# Patient Record
Sex: FEMALE | Race: WHITE | Employment: FULL TIME | URBAN - METROPOLITAN AREA
[De-identification: names, ages, dates, MRNs, and addresses within clinical notes are randomized per-mention and may not be internally consistent; named-entity substitution may affect disease eponyms.]

---

## 2017-07-12 ENCOUNTER — HOSPITAL ENCOUNTER (EMERGENCY)
Facility: CLINIC | Age: 22
Discharge: HOME | End: 2017-07-12
Attending: EMERGENCY MEDICINE
Payer: COMMERCIAL

## 2017-07-12 VITALS
TEMPERATURE: 97.9 F | SYSTOLIC BLOOD PRESSURE: 117 MMHG | HEIGHT: 64 IN | OXYGEN SATURATION: 100 % | BODY MASS INDEX: 24.24 KG/M2 | WEIGHT: 142 LBS | DIASTOLIC BLOOD PRESSURE: 71 MMHG | HEART RATE: 64 BPM | RESPIRATION RATE: 14 BRPM

## 2017-07-12 DIAGNOSIS — H10.33 ACUTE CONJUNCTIVITIS OF BOTH EYES, UNSPECIFIED ACUTE CONJUNCTIVITIS TYPE: Primary | ICD-10-CM

## 2017-07-12 PROCEDURE — 99282 EMERGENCY DEPT VISIT SF MDM: CPT

## 2017-07-12 RX ORDER — TOBRAMYCIN 3 MG/ML
1 SOLUTION/ DROPS OPHTHALMIC EVERY 4 HOURS
Qty: 5 ML | Refills: 0 | Status: SHIPPED | OUTPATIENT
Start: 2017-07-12 | End: 2017-07-22

## 2017-08-01 ENCOUNTER — APPOINTMENT (OUTPATIENT)
Dept: GENERAL RADIOLOGY | Facility: CLINIC | Age: 22
End: 2017-08-01
Payer: COMMERCIAL

## 2017-08-01 ENCOUNTER — HOSPITAL ENCOUNTER (EMERGENCY)
Facility: CLINIC | Age: 22
Discharge: HOME | End: 2017-08-01
Attending: EMERGENCY MEDICINE
Payer: COMMERCIAL

## 2017-08-01 VITALS
OXYGEN SATURATION: 98 % | DIASTOLIC BLOOD PRESSURE: 65 MMHG | RESPIRATION RATE: 16 BRPM | HEART RATE: 85 BPM | BODY MASS INDEX: 23.05 KG/M2 | SYSTOLIC BLOOD PRESSURE: 118 MMHG | HEIGHT: 64 IN | TEMPERATURE: 98.4 F | WEIGHT: 135 LBS

## 2017-08-01 DIAGNOSIS — R05.9 COUGH: Primary | ICD-10-CM

## 2017-08-01 PROCEDURE — 71020 XR CHEST STANDARD TWO VW: CPT

## 2017-08-01 PROCEDURE — 99283 EMERGENCY DEPT VISIT LOW MDM: CPT

## 2017-08-01 RX ORDER — BENZONATATE 100 MG/1
100 CAPSULE ORAL 3 TIMES DAILY PRN
Qty: 30 CAPSULE | Refills: 0 | Status: SHIPPED | OUTPATIENT
Start: 2017-08-01 | End: 2017-08-08

## 2017-08-01 ASSESSMENT — PAIN SCALES - GENERAL: PAINLEVEL_OUTOF10: 6

## 2017-08-01 ASSESSMENT — PAIN DESCRIPTION - PAIN TYPE: TYPE: ACUTE PAIN

## 2017-08-01 ASSESSMENT — PAIN DESCRIPTION - LOCATION: LOCATION: THROAT

## 2018-02-05 ENCOUNTER — HOSPITAL ENCOUNTER (EMERGENCY)
Facility: CLINIC | Age: 23
Discharge: HOME | End: 2018-02-05
Attending: EMERGENCY MEDICINE
Payer: COMMERCIAL

## 2018-02-05 VITALS
BODY MASS INDEX: 24.32 KG/M2 | TEMPERATURE: 98.1 F | RESPIRATION RATE: 16 BRPM | SYSTOLIC BLOOD PRESSURE: 129 MMHG | HEIGHT: 65 IN | HEART RATE: 56 BPM | WEIGHT: 146 LBS | DIASTOLIC BLOOD PRESSURE: 69 MMHG | OXYGEN SATURATION: 100 %

## 2018-02-05 DIAGNOSIS — N39.0 URINARY TRACT INFECTION WITHOUT HEMATURIA, SITE UNSPECIFIED: Primary | ICD-10-CM

## 2018-02-05 LAB
-: ABNORMAL
AMORPHOUS: ABNORMAL
BACTERIA: ABNORMAL
BILIRUBIN URINE: NEGATIVE
CASTS UA: ABNORMAL /LPF (ref 0–2)
COLOR: YELLOW
COMMENT UA: ABNORMAL
CRYSTALS, UA: ABNORMAL /HPF
EPITHELIAL CELLS UA: ABNORMAL /HPF (ref 0–5)
GLUCOSE URINE: NEGATIVE
HCG(URINE) PREGNANCY TEST: NEGATIVE
KETONES, URINE: NEGATIVE
LEUKOCYTE ESTERASE, URINE: ABNORMAL
MUCUS: ABNORMAL
NITRITE, URINE: NEGATIVE
OTHER OBSERVATIONS UA: ABNORMAL
PH UA: 5.5 (ref 5–8)
PROTEIN UA: NEGATIVE
RBC UA: ABNORMAL /HPF (ref 0–2)
RENAL EPITHELIAL, UA: ABNORMAL /HPF
SPECIFIC GRAVITY UA: 1 (ref 1–1.03)
TRICHOMONAS: ABNORMAL
TURBIDITY: CLEAR
URINE HGB: ABNORMAL
UROBILINOGEN, URINE: NORMAL
WBC UA: ABNORMAL /HPF (ref 0–5)
YEAST: ABNORMAL

## 2018-02-05 PROCEDURE — 99283 EMERGENCY DEPT VISIT LOW MDM: CPT

## 2018-02-05 PROCEDURE — 84703 CHORIONIC GONADOTROPIN ASSAY: CPT

## 2018-02-05 PROCEDURE — 87086 URINE CULTURE/COLONY COUNT: CPT

## 2018-02-05 PROCEDURE — 87077 CULTURE AEROBIC IDENTIFY: CPT

## 2018-02-05 PROCEDURE — 81001 URINALYSIS AUTO W/SCOPE: CPT

## 2018-02-05 RX ORDER — SULFAMETHOXAZOLE AND TRIMETHOPRIM 800; 160 MG/1; MG/1
1 TABLET ORAL 2 TIMES DAILY
Qty: 14 TABLET | Refills: 0 | Status: SHIPPED | OUTPATIENT
Start: 2018-02-05 | End: 2018-02-12

## 2018-02-05 RX ORDER — FLUCONAZOLE 150 MG/1
150 TABLET ORAL ONCE
Qty: 1 TABLET | Refills: 0 | Status: SHIPPED | OUTPATIENT
Start: 2018-02-05 | End: 2018-02-05

## 2018-02-05 ASSESSMENT — PAIN DESCRIPTION - LOCATION
LOCATION: FLANK
LOCATION: FLANK;BACK

## 2018-02-05 ASSESSMENT — PAIN DESCRIPTION - PAIN TYPE
TYPE: ACUTE PAIN
TYPE: ACUTE PAIN

## 2018-02-05 ASSESSMENT — PAIN DESCRIPTION - ORIENTATION
ORIENTATION: RIGHT;LEFT
ORIENTATION: RIGHT;LEFT

## 2018-02-05 ASSESSMENT — PAIN SCALES - GENERAL
PAINLEVEL_OUTOF10: 7
PAINLEVEL_OUTOF10: 8

## 2018-02-07 LAB
CULTURE: ABNORMAL
Lab: ABNORMAL
SPECIMEN DESCRIPTION: ABNORMAL
SPECIMEN DESCRIPTION: ABNORMAL
STATUS: ABNORMAL

## 2018-03-15 ENCOUNTER — HOSPITAL ENCOUNTER (EMERGENCY)
Facility: CLINIC | Age: 23
Discharge: HOME | End: 2018-03-15
Attending: EMERGENCY MEDICINE
Payer: COMMERCIAL

## 2018-03-15 VITALS
WEIGHT: 140 LBS | OXYGEN SATURATION: 100 % | BODY MASS INDEX: 23.32 KG/M2 | HEART RATE: 68 BPM | DIASTOLIC BLOOD PRESSURE: 64 MMHG | HEIGHT: 65 IN | TEMPERATURE: 97.8 F | SYSTOLIC BLOOD PRESSURE: 116 MMHG | RESPIRATION RATE: 16 BRPM

## 2018-03-15 DIAGNOSIS — L01.00 IMPETIGO: Primary | ICD-10-CM

## 2018-03-15 PROCEDURE — 6370000000 HC RX 637 (ALT 250 FOR IP): Performed by: EMERGENCY MEDICINE

## 2018-03-15 PROCEDURE — 99282 EMERGENCY DEPT VISIT SF MDM: CPT

## 2018-03-15 RX ORDER — DOXYCYCLINE HYCLATE 100 MG
100 TABLET ORAL 2 TIMES DAILY
Qty: 20 TABLET | Refills: 0 | Status: ON HOLD | OUTPATIENT
Start: 2018-03-15 | End: 2018-05-28

## 2018-03-15 RX ORDER — DOXYCYCLINE 100 MG/1
100 CAPSULE ORAL ONCE
Status: COMPLETED | OUTPATIENT
Start: 2018-03-15 | End: 2018-03-15

## 2018-03-15 RX ADMIN — DOXYCYCLINE 100 MG: 100 CAPSULE ORAL at 22:12

## 2018-03-15 ASSESSMENT — ENCOUNTER SYMPTOMS
RHINORRHEA: 0
EYE PAIN: 0
NAUSEA: 0
CHEST TIGHTNESS: 0
SHORTNESS OF BREATH: 0
EYE DISCHARGE: 0
COUGH: 0
SORE THROAT: 0
BACK PAIN: 0
EYE REDNESS: 0
CONSTIPATION: 0
COLOR CHANGE: 1
WHEEZING: 0
ABDOMINAL PAIN: 0
DIARRHEA: 0

## 2018-03-15 ASSESSMENT — PAIN SCALES - GENERAL: PAINLEVEL_OUTOF10: 5

## 2018-03-15 ASSESSMENT — PAIN DESCRIPTION - LOCATION: LOCATION: MOUTH

## 2018-03-15 ASSESSMENT — PAIN DESCRIPTION - ORIENTATION: ORIENTATION: RIGHT

## 2018-05-08 ENCOUNTER — HOSPITAL ENCOUNTER (EMERGENCY)
Facility: CLINIC | Age: 23
Discharge: HOME | End: 2018-05-08
Attending: EMERGENCY MEDICINE
Payer: COMMERCIAL

## 2018-05-08 VITALS
RESPIRATION RATE: 16 BRPM | HEIGHT: 65 IN | WEIGHT: 142 LBS | SYSTOLIC BLOOD PRESSURE: 124 MMHG | OXYGEN SATURATION: 99 % | DIASTOLIC BLOOD PRESSURE: 68 MMHG | HEART RATE: 77 BPM | TEMPERATURE: 98.6 F | BODY MASS INDEX: 23.66 KG/M2

## 2018-05-08 DIAGNOSIS — E86.0 DEHYDRATION: ICD-10-CM

## 2018-05-08 DIAGNOSIS — R11.2 NAUSEA VOMITING AND DIARRHEA: Primary | ICD-10-CM

## 2018-05-08 DIAGNOSIS — R19.7 NAUSEA VOMITING AND DIARRHEA: Primary | ICD-10-CM

## 2018-05-08 LAB
-: ABNORMAL
ABSOLUTE EOS #: 0.1 K/UL (ref 0–0.4)
ABSOLUTE IMMATURE GRANULOCYTE: ABNORMAL K/UL (ref 0–0.3)
ABSOLUTE LYMPH #: 0.6 K/UL (ref 1.2–5.2)
ABSOLUTE MONO #: 0.4 K/UL (ref 0.1–1.4)
ALBUMIN SERPL-MCNC: 4.2 G/DL (ref 3.5–5.2)
ALBUMIN/GLOBULIN RATIO: 1.2 (ref 1–2.5)
ALP BLD-CCNC: 62 U/L (ref 35–104)
ALT SERPL-CCNC: 18 U/L (ref 5–33)
AMORPHOUS: ABNORMAL
AMYLASE: 80 U/L (ref 28–100)
ANION GAP SERPL CALCULATED.3IONS-SCNC: 15 MMOL/L (ref 9–17)
AST SERPL-CCNC: 26 U/L
BACTERIA: ABNORMAL
BASOPHILS # BLD: 0 % (ref 0–2)
BASOPHILS ABSOLUTE: 0 K/UL (ref 0–0.2)
BILIRUB SERPL-MCNC: 0.4 MG/DL (ref 0.3–1.2)
BILIRUBIN DIRECT: 0.1 MG/DL
BILIRUBIN URINE: NEGATIVE
BILIRUBIN, INDIRECT: 0.3 MG/DL (ref 0–1)
BUN BLDV-MCNC: 14 MG/DL (ref 6–20)
BUN/CREAT BLD: ABNORMAL (ref 9–20)
CALCIUM SERPL-MCNC: 9.5 MG/DL (ref 8.6–10.4)
CASTS UA: ABNORMAL /LPF (ref 0–2)
CHLORIDE BLD-SCNC: 104 MMOL/L (ref 98–107)
CO2: 23 MMOL/L (ref 20–31)
COLOR: YELLOW
COMMENT UA: ABNORMAL
CREAT SERPL-MCNC: 0.7 MG/DL (ref 0.5–0.9)
CRYSTALS, UA: ABNORMAL /HPF
DIFFERENTIAL TYPE: ABNORMAL
EOSINOPHILS RELATIVE PERCENT: 1 % (ref 1–4)
EPITHELIAL CELLS UA: ABNORMAL /HPF (ref 0–5)
GFR AFRICAN AMERICAN: >60 ML/MIN
GFR NON-AFRICAN AMERICAN: >60 ML/MIN
GFR SERPL CREATININE-BSD FRML MDRD: ABNORMAL ML/MIN/{1.73_M2}
GFR SERPL CREATININE-BSD FRML MDRD: ABNORMAL ML/MIN/{1.73_M2}
GLOBULIN: NORMAL G/DL (ref 1.5–3.8)
GLUCOSE BLD-MCNC: 119 MG/DL (ref 70–99)
GLUCOSE URINE: NEGATIVE
HCG QUALITATIVE: NEGATIVE
HCT VFR BLD CALC: 42.6 % (ref 36–46)
HEMOGLOBIN: 13.8 G/DL (ref 12–16)
IMMATURE GRANULOCYTES: ABNORMAL %
KETONES, URINE: NEGATIVE
LEUKOCYTE ESTERASE, URINE: NEGATIVE
LIPASE: 31 U/L (ref 13–60)
LYMPHOCYTES # BLD: 5 % (ref 25–45)
MCH RBC QN AUTO: 28.3 PG (ref 26–34)
MCHC RBC AUTO-ENTMCNC: 32.5 G/DL (ref 31–37)
MCV RBC AUTO: 87.2 FL (ref 80–100)
MONOCYTES # BLD: 4 % (ref 2–8)
MUCUS: ABNORMAL
NITRITE, URINE: NEGATIVE
NRBC AUTOMATED: ABNORMAL PER 100 WBC
OTHER OBSERVATIONS UA: ABNORMAL
PDW BLD-RTO: 14.4 % (ref 12.5–15.4)
PH UA: 5.5 (ref 5–8)
PLATELET # BLD: 307 K/UL (ref 140–450)
PLATELET ESTIMATE: ABNORMAL
PMV BLD AUTO: 7.7 FL (ref 6–12)
POTASSIUM SERPL-SCNC: 4.2 MMOL/L (ref 3.7–5.3)
PROTEIN UA: NEGATIVE
RBC # BLD: 4.88 M/UL (ref 4–5.2)
RBC # BLD: ABNORMAL 10*6/UL
RBC UA: ABNORMAL /HPF (ref 0–2)
RENAL EPITHELIAL, UA: ABNORMAL /HPF
SEG NEUTROPHILS: 90 % (ref 34–64)
SEGMENTED NEUTROPHILS ABSOLUTE COUNT: 9.6 K/UL (ref 1.8–8)
SODIUM BLD-SCNC: 142 MMOL/L (ref 135–144)
SPECIFIC GRAVITY UA: 1.02 (ref 1–1.03)
TOTAL PROTEIN: 7.7 G/DL (ref 6.4–8.3)
TRICHOMONAS: ABNORMAL
TURBIDITY: CLEAR
URINE HGB: ABNORMAL
UROBILINOGEN, URINE: NORMAL
WBC # BLD: 10.7 K/UL (ref 4.5–13.5)
WBC # BLD: ABNORMAL 10*3/UL
WBC UA: ABNORMAL /HPF (ref 0–5)
YEAST: ABNORMAL

## 2018-05-08 PROCEDURE — 80048 BASIC METABOLIC PNL TOTAL CA: CPT

## 2018-05-08 PROCEDURE — 83690 ASSAY OF LIPASE: CPT

## 2018-05-08 PROCEDURE — 96374 THER/PROPH/DIAG INJ IV PUSH: CPT

## 2018-05-08 PROCEDURE — 87086 URINE CULTURE/COLONY COUNT: CPT

## 2018-05-08 PROCEDURE — 96361 HYDRATE IV INFUSION ADD-ON: CPT

## 2018-05-08 PROCEDURE — 99283 EMERGENCY DEPT VISIT LOW MDM: CPT

## 2018-05-08 PROCEDURE — 84703 CHORIONIC GONADOTROPIN ASSAY: CPT

## 2018-05-08 PROCEDURE — 81001 URINALYSIS AUTO W/SCOPE: CPT

## 2018-05-08 PROCEDURE — 82150 ASSAY OF AMYLASE: CPT

## 2018-05-08 PROCEDURE — 6360000002 HC RX W HCPCS: Performed by: EMERGENCY MEDICINE

## 2018-05-08 PROCEDURE — 80076 HEPATIC FUNCTION PANEL: CPT

## 2018-05-08 PROCEDURE — 85025 COMPLETE CBC W/AUTO DIFF WBC: CPT

## 2018-05-08 PROCEDURE — 2580000003 HC RX 258: Performed by: EMERGENCY MEDICINE

## 2018-05-08 PROCEDURE — 36415 COLL VENOUS BLD VENIPUNCTURE: CPT

## 2018-05-08 RX ORDER — ONDANSETRON 4 MG/1
4 TABLET, FILM COATED ORAL EVERY 8 HOURS PRN
Qty: 20 TABLET | Refills: 0 | Status: ON HOLD | OUTPATIENT
Start: 2018-05-08 | End: 2018-05-28

## 2018-05-08 RX ORDER — ONDANSETRON 2 MG/ML
4 INJECTION INTRAMUSCULAR; INTRAVENOUS ONCE
Status: COMPLETED | OUTPATIENT
Start: 2018-05-08 | End: 2018-05-08

## 2018-05-08 RX ORDER — ALBUTEROL SULFATE 2.5 MG/3ML
2.5 SOLUTION RESPIRATORY (INHALATION) EVERY 6 HOURS PRN
Status: ON HOLD | COMMUNITY
End: 2018-05-28

## 2018-05-08 RX ORDER — 0.9 % SODIUM CHLORIDE 0.9 %
1000 INTRAVENOUS SOLUTION INTRAVENOUS ONCE
Status: COMPLETED | OUTPATIENT
Start: 2018-05-08 | End: 2018-05-08

## 2018-05-08 RX ADMIN — ONDANSETRON 4 MG: 2 INJECTION INTRAMUSCULAR; INTRAVENOUS at 03:35

## 2018-05-08 RX ADMIN — SODIUM CHLORIDE 1000 ML: 9 INJECTION, SOLUTION INTRAVENOUS at 03:35

## 2018-05-09 LAB
CULTURE: NORMAL
CULTURE: NORMAL
Lab: NORMAL
SPECIMEN DESCRIPTION: NORMAL
SPECIMEN DESCRIPTION: NORMAL
STATUS: NORMAL

## 2018-05-28 ENCOUNTER — HOSPITAL ENCOUNTER (INPATIENT)
Age: 23
LOS: 3 days | Discharge: HOME | DRG: 751 | End: 2018-05-31
Attending: PSYCHIATRY & NEUROLOGY | Admitting: PSYCHIATRY & NEUROLOGY
Payer: COMMERCIAL

## 2018-05-28 PROBLEM — F33.9 MAJOR DEPRESSION, RECURRENT (HCC): Status: ACTIVE | Noted: 2018-05-28

## 2018-05-28 PROCEDURE — 1240000000 HC EMOTIONAL WELLNESS R&B

## 2018-05-28 PROCEDURE — 94664 DEMO&/EVAL PT USE INHALER: CPT

## 2018-05-28 RX ORDER — BENZTROPINE MESYLATE 1 MG/ML
2 INJECTION INTRAMUSCULAR; INTRAVENOUS 2 TIMES DAILY PRN
Status: DISCONTINUED | OUTPATIENT
Start: 2018-05-28 | End: 2018-05-31 | Stop reason: HOSPADM

## 2018-05-28 RX ORDER — ACETAMINOPHEN 325 MG/1
650 TABLET ORAL EVERY 4 HOURS PRN
Status: DISCONTINUED | OUTPATIENT
Start: 2018-05-28 | End: 2018-05-31 | Stop reason: HOSPADM

## 2018-05-28 RX ORDER — NICOTINE 21 MG/24HR
1 PATCH, TRANSDERMAL 24 HOURS TRANSDERMAL DAILY
Status: DISCONTINUED | OUTPATIENT
Start: 2018-05-29 | End: 2018-05-29

## 2018-05-28 RX ORDER — ALBUTEROL SULFATE 90 UG/1
2 AEROSOL, METERED RESPIRATORY (INHALATION) EVERY 6 HOURS PRN
COMMUNITY
End: 2019-10-06

## 2018-05-28 RX ORDER — TRAZODONE HYDROCHLORIDE 50 MG/1
50 TABLET ORAL NIGHTLY PRN
Status: DISCONTINUED | OUTPATIENT
Start: 2018-05-29 | End: 2018-05-31 | Stop reason: HOSPADM

## 2018-05-28 RX ORDER — MAGNESIUM HYDROXIDE/ALUMINUM HYDROXICE/SIMETHICONE 120; 1200; 1200 MG/30ML; MG/30ML; MG/30ML
30 SUSPENSION ORAL EVERY 6 HOURS PRN
Status: DISCONTINUED | OUTPATIENT
Start: 2018-05-28 | End: 2018-05-31 | Stop reason: HOSPADM

## 2018-05-28 RX ORDER — ALBUTEROL SULFATE 90 UG/1
2 AEROSOL, METERED RESPIRATORY (INHALATION) EVERY 6 HOURS PRN
Status: DISCONTINUED | OUTPATIENT
Start: 2018-05-28 | End: 2018-05-29 | Stop reason: SDUPTHER

## 2018-05-28 RX ORDER — HYDROXYZINE HYDROCHLORIDE 25 MG/1
50 TABLET, FILM COATED ORAL 3 TIMES DAILY PRN
Status: DISCONTINUED | OUTPATIENT
Start: 2018-05-28 | End: 2018-05-31 | Stop reason: HOSPADM

## 2018-05-28 ASSESSMENT — SLEEP AND FATIGUE QUESTIONNAIRES
DO YOU USE A SLEEP AID: NO
DO YOU HAVE DIFFICULTY SLEEPING: NO

## 2018-05-28 ASSESSMENT — LIFESTYLE VARIABLES: HISTORY_ALCOHOL_USE: YES

## 2018-05-28 ASSESSMENT — PAIN SCALES - GENERAL: PAINLEVEL_OUTOF10: 0

## 2018-05-29 LAB
CHOLESTEROL/HDL RATIO: 2.1
CHOLESTEROL: 98 MG/DL
HDLC SERPL-MCNC: 46 MG/DL
LDL CHOLESTEROL: 45 MG/DL (ref 0–130)
THYROXINE, FREE: 1.28 NG/DL (ref 0.93–1.7)
TRIGL SERPL-MCNC: 36 MG/DL
TSH SERPL DL<=0.05 MIU/L-ACNC: 1.53 MIU/L (ref 0.3–5)
VLDLC SERPL CALC-MCNC: NORMAL MG/DL (ref 1–30)

## 2018-05-29 PROCEDURE — 1240000000 HC EMOTIONAL WELLNESS R&B

## 2018-05-29 PROCEDURE — 36415 COLL VENOUS BLD VENIPUNCTURE: CPT

## 2018-05-29 PROCEDURE — 99223 1ST HOSP IP/OBS HIGH 75: CPT | Performed by: PSYCHIATRY & NEUROLOGY

## 2018-05-29 PROCEDURE — 6370000000 HC RX 637 (ALT 250 FOR IP): Performed by: PSYCHIATRY & NEUROLOGY

## 2018-05-29 PROCEDURE — 84443 ASSAY THYROID STIM HORMONE: CPT

## 2018-05-29 PROCEDURE — 84439 ASSAY OF FREE THYROXINE: CPT

## 2018-05-29 PROCEDURE — 80061 LIPID PANEL: CPT

## 2018-05-29 PROCEDURE — 94664 DEMO&/EVAL PT USE INHALER: CPT

## 2018-05-29 RX ORDER — ALBUTEROL SULFATE 90 UG/1
2 AEROSOL, METERED RESPIRATORY (INHALATION) EVERY 6 HOURS PRN
Status: DISCONTINUED | OUTPATIENT
Start: 2018-05-29 | End: 2018-05-31 | Stop reason: HOSPADM

## 2018-05-29 RX ORDER — BUPROPION HYDROCHLORIDE 150 MG/1
150 TABLET ORAL DAILY
Status: DISCONTINUED | OUTPATIENT
Start: 2018-05-29 | End: 2018-05-31 | Stop reason: HOSPADM

## 2018-05-29 RX ADMIN — BUPROPION HYDROCHLORIDE 150 MG: 150 TABLET, FILM COATED, EXTENDED RELEASE ORAL at 12:28

## 2018-05-29 ASSESSMENT — LIFESTYLE VARIABLES: HISTORY_ALCOHOL_USE: YES

## 2018-05-29 ASSESSMENT — PAIN SCALES - GENERAL: PAINLEVEL_OUTOF10: 0

## 2018-05-30 PROCEDURE — 1240000000 HC EMOTIONAL WELLNESS R&B

## 2018-05-30 PROCEDURE — 6370000000 HC RX 637 (ALT 250 FOR IP): Performed by: PSYCHIATRY & NEUROLOGY

## 2018-05-30 PROCEDURE — 99232 SBSQ HOSP IP/OBS MODERATE 35: CPT | Performed by: PSYCHIATRY & NEUROLOGY

## 2018-05-30 RX ADMIN — BUPROPION HYDROCHLORIDE 150 MG: 150 TABLET, FILM COATED, EXTENDED RELEASE ORAL at 08:40

## 2018-05-31 VITALS
BODY MASS INDEX: 22.99 KG/M2 | DIASTOLIC BLOOD PRESSURE: 52 MMHG | WEIGHT: 138 LBS | TEMPERATURE: 97.9 F | HEIGHT: 65 IN | RESPIRATION RATE: 14 BRPM | OXYGEN SATURATION: 99 % | SYSTOLIC BLOOD PRESSURE: 114 MMHG | HEART RATE: 67 BPM

## 2018-05-31 PROCEDURE — 6370000000 HC RX 637 (ALT 250 FOR IP): Performed by: PSYCHIATRY & NEUROLOGY

## 2018-05-31 PROCEDURE — 5130000000 HC BRIDGE APPOINTMENT

## 2018-05-31 PROCEDURE — 99238 HOSP IP/OBS DSCHRG MGMT 30/<: CPT | Performed by: PSYCHIATRY & NEUROLOGY

## 2018-05-31 RX ORDER — BUPROPION HYDROCHLORIDE 150 MG/1
150 TABLET ORAL DAILY
Qty: 30 TABLET | Refills: 3 | Status: SHIPPED | OUTPATIENT
Start: 2018-06-01 | End: 2018-08-16 | Stop reason: ALTCHOICE

## 2018-05-31 RX ADMIN — BUPROPION HYDROCHLORIDE 150 MG: 150 TABLET, FILM COATED, EXTENDED RELEASE ORAL at 08:37

## 2018-07-01 ENCOUNTER — HOSPITAL ENCOUNTER (EMERGENCY)
Facility: CLINIC | Age: 23
Discharge: HOME | End: 2018-07-01
Attending: EMERGENCY MEDICINE
Payer: COMMERCIAL

## 2018-07-01 VITALS
BODY MASS INDEX: 22.82 KG/M2 | TEMPERATURE: 98.6 F | DIASTOLIC BLOOD PRESSURE: 59 MMHG | RESPIRATION RATE: 16 BRPM | HEIGHT: 65 IN | SYSTOLIC BLOOD PRESSURE: 116 MMHG | WEIGHT: 137 LBS | HEART RATE: 68 BPM | OXYGEN SATURATION: 99 %

## 2018-07-01 DIAGNOSIS — R05.9 COUGH: Primary | ICD-10-CM

## 2018-07-01 PROCEDURE — 99282 EMERGENCY DEPT VISIT SF MDM: CPT

## 2018-07-01 PROCEDURE — 6370000000 HC RX 637 (ALT 250 FOR IP): Performed by: EMERGENCY MEDICINE

## 2018-07-01 RX ORDER — BENZONATATE 100 MG/1
100 CAPSULE ORAL ONCE
Status: COMPLETED | OUTPATIENT
Start: 2018-07-01 | End: 2018-07-01

## 2018-07-01 RX ORDER — BENZONATATE 100 MG/1
100 CAPSULE ORAL 3 TIMES DAILY PRN
Qty: 30 CAPSULE | Refills: 0 | Status: SHIPPED | OUTPATIENT
Start: 2018-07-01 | End: 2018-07-08

## 2018-07-01 RX ADMIN — BENZONATATE 100 MG: 100 CAPSULE ORAL at 21:52

## 2018-07-01 ASSESSMENT — PAIN DESCRIPTION - FREQUENCY: FREQUENCY: CONTINUOUS

## 2018-07-01 ASSESSMENT — PAIN DESCRIPTION - LOCATION: LOCATION: THROAT

## 2018-07-01 ASSESSMENT — PAIN DESCRIPTION - DESCRIPTORS: DESCRIPTORS: ACHING

## 2018-07-01 ASSESSMENT — PAIN DESCRIPTION - PAIN TYPE: TYPE: ACUTE PAIN

## 2018-07-01 ASSESSMENT — PAIN SCALES - GENERAL: PAINLEVEL_OUTOF10: 3

## 2018-07-02 NOTE — ED PROVIDER NOTES
oxygen saturation is 99%. Gen.: Patient is well-hydrated, nontoxic-appearing young female no acute distress. HEENT: Head is atraumatic. Conjunctiva are clear. TMs are clear. Oromucosa pink and moist.  Posterior pharynx without erythema or exudate. Neck: Supple. No meningismus. Respiratory: Lung sounds are clear bilateral.  Cardiac: Heart is regular rate and rhythm    DIFFERENTIAL DIAGNOSIS/ MDM:     Cough, URI    DIAGNOSTIC RESULTS         LABS:  Labs Reviewed - No data to display      EMERGENCY DEPARTMENT COURSE:   Vitals:    Vitals:    07/01/18 2123   BP: (!) 116/59   Pulse: 68   Resp: 16   Temp: 98.6 °F (37 °C)   TempSrc: Oral   SpO2: 99%   Weight: 62.1 kg (137 lb)   Height: 5' 5\" (1.651 m)     -------------------------  BP: (!) 116/59, Temp: 98.6 °F (37 °C), Pulse: 68, Resp: 16    Orders Placed This Encounter   Medications    benzonatate (TESSALON) capsule 100 mg    benzonatate (TESSALON PERLES) 100 MG capsule     Sig: Take 1 capsule by mouth 3 times daily as needed for Cough     Dispense:  30 capsule     Refill:  0           Re-evaluation Notes    . Patient is afebrile here. No focal findings on exam to indicate an acute bacterial infection. She will be discharged with [place]. Follow-up as directed. Return if worse        FINAL IMPRESSION      1. Cough          DISPOSITION/PLAN   DISPOSITION Decision To Discharge 07/01/2018 09:45:17 PM      Condition on Disposition    Stable    PATIENT REFERRED TO:  No follow-up provider specified. DISCHARGE MEDICATIONS:  New Prescriptions    BENZONATATE (TESSALON PERLES) 100 MG CAPSULE    Take 1 capsule by mouth 3 times daily as needed for Cough       (Please note that portions of this note were completed with a voice recognition program.  Efforts were made to edit the dictations but occasionally words are mis-transcribed.)    [place] MD, F.A.C.E.P.   Attending Emergency Physician        Santos Dutton MD  07/01/18 6536

## 2018-08-01 ENCOUNTER — HOSPITAL ENCOUNTER (EMERGENCY)
Facility: CLINIC | Age: 23
Discharge: HOME | End: 2018-08-01
Attending: EMERGENCY MEDICINE
Payer: COMMERCIAL

## 2018-08-01 VITALS
TEMPERATURE: 98.3 F | HEART RATE: 68 BPM | BODY MASS INDEX: 22.99 KG/M2 | WEIGHT: 138 LBS | DIASTOLIC BLOOD PRESSURE: 57 MMHG | HEIGHT: 65 IN | SYSTOLIC BLOOD PRESSURE: 110 MMHG | OXYGEN SATURATION: 100 % | RESPIRATION RATE: 14 BRPM

## 2018-08-01 DIAGNOSIS — L73.9 FOLLICULITIS: Primary | ICD-10-CM

## 2018-08-01 PROCEDURE — 99282 EMERGENCY DEPT VISIT SF MDM: CPT

## 2018-08-01 RX ORDER — DOXYCYCLINE 100 MG/1
100 TABLET ORAL 2 TIMES DAILY
Qty: 20 TABLET | Refills: 0 | Status: SHIPPED | OUTPATIENT
Start: 2018-08-01 | End: 2018-08-11

## 2018-08-01 ASSESSMENT — PAIN DESCRIPTION - PROGRESSION: CLINICAL_PROGRESSION: NOT CHANGED

## 2018-08-01 ASSESSMENT — PAIN DESCRIPTION - DESCRIPTORS: DESCRIPTORS: ACHING

## 2018-08-01 ASSESSMENT — PAIN SCALES - GENERAL: PAINLEVEL_OUTOF10: 4

## 2018-08-01 ASSESSMENT — PAIN DESCRIPTION - PAIN TYPE: TYPE: ACUTE PAIN

## 2018-08-01 ASSESSMENT — ENCOUNTER SYMPTOMS: SHORTNESS OF BREATH: 0

## 2018-08-01 ASSESSMENT — PAIN DESCRIPTION - FREQUENCY: FREQUENCY: CONTINUOUS

## 2018-08-01 ASSESSMENT — PAIN DESCRIPTION - ORIENTATION: ORIENTATION: RIGHT;UPPER

## 2018-08-01 ASSESSMENT — PAIN DESCRIPTION - LOCATION: LOCATION: MOUTH

## 2018-08-01 NOTE — ED PROVIDER NOTES
. She reports that she does not use drugs. PHYSICAL EXAM    (up to 7 for level 4, 8 or more for level 5)   INITIAL VITALS:  height is 5' 5\" (1.651 m) and weight is 62.6 kg (138 lb). Her oral temperature is 98.3 °F (36.8 °C). Her blood pressure is 110/57 (abnormal) and her pulse is 68. Her respiration is 14 and oxygen saturation is 100%. Physical Exam   Constitutional: She appears well-developed and well-nourished. HENT:   Patient is noted to have a small folliculitis to the right upper lip. No palpable abscess appreciated. No other abnormalities. No trismus   Eyes: Conjunctivae are normal.   Neck: Normal range of motion. Neck supple. Cardiovascular: Normal rate, regular rhythm and normal heart sounds. Pulmonary/Chest: Effort normal and breath sounds normal. No respiratory distress. She has no wheezes. She has no rales. Musculoskeletal: Normal range of motion. Lymphadenopathy:     She has no cervical adenopathy. Neurological: She is alert. No focal deficits appreciated   Skin:   The patient is noted have a small folliculitis to the right upper lip. Otherwise without further rashes or lesions   Psychiatric: She has a normal mood and affect. Nursing note and vitals reviewed. DIFFERENTIAL DIAGNOSIS/ MDM:     The patient presents with a folliculitis to the right upper lip. I will place the patient on doxycycline. I'm recommending that the patient apply warm Salt Water Solution to the Affected Area. Return to the ER for Increased Pain, Swelling, Redness, Fever, Difficulty Breathing or Other Concerns Otherwise to Follow-Up with Her Family Doctor within the Next Few Days    DIAGNOSTIC RESULTS         LABS:  No results found for this visit on 08/01/18.         EMERGENCY DEPARTMENT COURSE:   Vitals:    Vitals:    08/01/18 0957   BP: (!) 110/57   Pulse: 68   Resp: 14   Temp: 98.3 °F (36.8 °C)   TempSrc: Oral   SpO2: 100%   Weight: 62.6 kg (138 lb)   Height: 5' 5\" (1.651 m) -------------------------  BP: (!) 110/57, Temp: 98.3 °F (36.8 °C), Pulse: 68, Resp: 14      RE-EVALUATION:  At this time the patient is without objective evidence of an acute process requiring hospitalization or inpatient management. They have remained hemodynamically stable throughout their entire ED visit and are stable for discharge with outpatient follow-up. The plan has been discussed in detail and they are aware of the specific conditions for emergent return, as well as the importance of follow-up    I have reviewed the disposition diagnosis with the patient and or their family/guardian. I have answered their questions and given discharge instructions. They voiced understanding of these instructions and did not have any further questions or complaints. PROCEDURES:  None    FINAL IMPRESSION      1. Folliculitis          DISPOSITION/PLAN   DISPOSITION Decision To Discharge 08/01/2018 10:14:08 AM      CONDITION ON DISPOSITION:   Stable    PATIENT REFERRED TO:    Your Family Doctor  Call in 2 days        DISCHARGE MEDICATIONS:  New Prescriptions    DOXYCYCLINE MONOHYDRATE (ADOXA) 100 MG TABLET    Take 1 tablet by mouth 2 times daily for 10 days       (Please note that portions of this note were completed with a voice recognition program.  Efforts were made to edit the dictations but occasionally words are mis-transcribed.)    Hanna MD, F.A.C.E.P.   Attending Emergency Medicine Physician       Erin Torrez MD  08/01/18 3144

## 2018-08-01 NOTE — ED TRIAGE NOTES
Pt presents to ED with complaint of right sided upper lip swelling and pain. Pt states pain started 3 days ago. She has not taken anything for the pain. This morning when she woke up she noticed her lip was swollen. Pt states this happened about 3 months ago and was seen here in ED. She was placed on doxycycline and it cleared up. Pt denies fever, chills.

## 2018-08-16 ENCOUNTER — APPOINTMENT (OUTPATIENT)
Dept: GENERAL RADIOLOGY | Facility: CLINIC | Age: 23
End: 2018-08-16
Payer: COMMERCIAL

## 2018-08-16 ENCOUNTER — HOSPITAL ENCOUNTER (EMERGENCY)
Facility: CLINIC | Age: 23
Discharge: HOME | End: 2018-08-16
Attending: EMERGENCY MEDICINE
Payer: COMMERCIAL

## 2018-08-16 VITALS
BODY MASS INDEX: 22.47 KG/M2 | DIASTOLIC BLOOD PRESSURE: 68 MMHG | WEIGHT: 135 LBS | HEART RATE: 74 BPM | SYSTOLIC BLOOD PRESSURE: 126 MMHG | RESPIRATION RATE: 18 BRPM | TEMPERATURE: 98.4 F | OXYGEN SATURATION: 99 %

## 2018-08-16 DIAGNOSIS — S93.402A SPRAIN OF LEFT ANKLE, UNSPECIFIED LIGAMENT, INITIAL ENCOUNTER: Primary | ICD-10-CM

## 2018-08-16 PROCEDURE — 99283 EMERGENCY DEPT VISIT LOW MDM: CPT

## 2018-08-16 PROCEDURE — 6370000000 HC RX 637 (ALT 250 FOR IP): Performed by: EMERGENCY MEDICINE

## 2018-08-16 PROCEDURE — 73610 X-RAY EXAM OF ANKLE: CPT

## 2018-08-16 RX ORDER — IBUPROFEN 800 MG/1
800 TABLET ORAL EVERY 8 HOURS PRN
Qty: 30 TABLET | Refills: 0 | Status: SHIPPED | OUTPATIENT
Start: 2018-08-16 | End: 2018-11-11

## 2018-08-16 RX ORDER — IBUPROFEN 800 MG/1
800 TABLET ORAL ONCE
Status: COMPLETED | OUTPATIENT
Start: 2018-08-16 | End: 2018-08-16

## 2018-08-16 RX ADMIN — IBUPROFEN 800 MG: 800 TABLET, FILM COATED ORAL at 12:27

## 2018-08-16 ASSESSMENT — ENCOUNTER SYMPTOMS
CONSTIPATION: 0
EYE PAIN: 0
VOMITING: 0
DIARRHEA: 0
BACK PAIN: 0
COUGH: 0
ABDOMINAL PAIN: 0
SHORTNESS OF BREATH: 0
NAUSEA: 0
BLOOD IN STOOL: 0

## 2018-08-16 ASSESSMENT — PAIN SCALES - GENERAL
PAINLEVEL_OUTOF10: 6
PAINLEVEL_OUTOF10: 6

## 2018-08-16 ASSESSMENT — PAIN DESCRIPTION - ORIENTATION: ORIENTATION: LEFT

## 2018-08-16 ASSESSMENT — PAIN DESCRIPTION - PAIN TYPE: TYPE: ACUTE PAIN

## 2018-08-16 ASSESSMENT — PAIN DESCRIPTION - LOCATION: LOCATION: ANKLE

## 2018-11-11 ENCOUNTER — HOSPITAL ENCOUNTER (EMERGENCY)
Facility: CLINIC | Age: 23
Discharge: HOME | End: 2018-11-11
Attending: EMERGENCY MEDICINE
Payer: COMMERCIAL

## 2018-11-11 VITALS
WEIGHT: 139 LBS | RESPIRATION RATE: 15 BRPM | TEMPERATURE: 98.4 F | OXYGEN SATURATION: 99 % | DIASTOLIC BLOOD PRESSURE: 60 MMHG | HEIGHT: 65 IN | BODY MASS INDEX: 23.16 KG/M2 | SYSTOLIC BLOOD PRESSURE: 119 MMHG | HEART RATE: 80 BPM

## 2018-11-11 DIAGNOSIS — K04.7 DENTAL ABSCESS: Primary | ICD-10-CM

## 2018-11-11 PROCEDURE — 99282 EMERGENCY DEPT VISIT SF MDM: CPT

## 2018-11-11 RX ORDER — IBUPROFEN 800 MG/1
800 TABLET ORAL EVERY 8 HOURS PRN
Qty: 30 TABLET | Refills: 0 | Status: SHIPPED | OUTPATIENT
Start: 2018-11-11 | End: 2019-04-06

## 2018-11-11 RX ORDER — DOCUSATE SODIUM 100 MG/1
100 CAPSULE, LIQUID FILLED ORAL 2 TIMES DAILY
COMMUNITY
End: 2019-10-06

## 2018-11-11 RX ORDER — CLINDAMYCIN HYDROCHLORIDE 150 MG/1
300 CAPSULE ORAL 4 TIMES DAILY
Qty: 28 CAPSULE | Refills: 0 | Status: SHIPPED | OUTPATIENT
Start: 2018-11-11 | End: 2018-11-18

## 2018-11-11 RX ORDER — FERROUS SULFATE 325(65) MG
325 TABLET ORAL
COMMUNITY
End: 2019-10-06

## 2018-11-11 ASSESSMENT — PAIN DESCRIPTION - PAIN TYPE
TYPE: ACUTE PAIN
TYPE: ACUTE PAIN

## 2018-11-11 ASSESSMENT — PAIN DESCRIPTION - ORIENTATION: ORIENTATION: RIGHT;UPPER

## 2018-11-11 ASSESSMENT — PAIN SCALES - GENERAL
PAINLEVEL_OUTOF10: 10
PAINLEVEL_OUTOF10: 10

## 2018-11-11 ASSESSMENT — ENCOUNTER SYMPTOMS
DIARRHEA: 0
BACK PAIN: 0
BLOOD IN STOOL: 0
VOMITING: 0
ABDOMINAL PAIN: 0
EYE PAIN: 0
COUGH: 0
CONSTIPATION: 0
SHORTNESS OF BREATH: 0
NAUSEA: 0
FACIAL SWELLING: 1

## 2018-11-11 ASSESSMENT — PAIN DESCRIPTION - FREQUENCY: FREQUENCY: CONTINUOUS

## 2018-11-11 ASSESSMENT — PAIN DESCRIPTION - LOCATION
LOCATION: TEETH
LOCATION: TEETH

## 2018-11-11 ASSESSMENT — PAIN DESCRIPTION - DESCRIPTORS: DESCRIPTORS: HEADACHE;OTHER (COMMENT)

## 2018-11-11 ASSESSMENT — PAIN - FUNCTIONAL ASSESSMENT: PAIN_FUNCTIONAL_ASSESSMENT: 0-10

## 2018-11-11 NOTE — ED NOTES
Patient arrives to the ED for complaints of R upper tooth pain. States it had been hurting the last few days, she has made a Dentist appt but is not able to get in for a couple weeks. She woke this AM with severe pain and in tears. States her R side of her face/eye hurts as well. Slight tissue swelling noted upon exam to R upper/back tooth. No other obvious issue. Denies fever/chills. Resting quietly, call light in reach.      Kalpana Flannery RN  11/11/18 3290

## 2018-11-11 NOTE — ED PROVIDER NOTES
[facility]  [address] [address]  Phone: [phone]        Pt Name: Magda Cox  MRN: [id number]  [place] 1995  Date of evaluation: 11/11/18      CHIEF COMPLAINT       Chief Complaint   Patient presents with    Dental Pain     has a dental appt in a couple weeks, woke up this AM w severe pain to R side. feels pain in to her R eye. HISTORY OF PRESENT ILLNESS    Magda Cox is a 26 y.o. female who presents Chief complaint of right toothache and right-sided facial swelling patient's that she's noticed had a bad tooth on that side for a little bit she has contacted a dentist appointment in 2 weeks this morning she woke up and felt like her face is swollen the right side the pain going up to her right eye no fevers or chills no nausea no vomiting no difficulty breathing or swallowing no difficulty opening her mouth      REVIEW OF SYSTEMS       Review of Systems   Constitutional: Negative for chills and fever. HENT: Positive for dental problem and facial swelling. Negative for congestion and ear pain. Eyes: Negative for pain and visual disturbance. Respiratory: Negative for cough and shortness of breath. Cardiovascular: Negative for chest pain, palpitations and leg swelling. Gastrointestinal: Negative for abdominal pain, blood in stool, constipation, diarrhea, nausea and vomiting. Endocrine: Negative for polydipsia and polyuria. Genitourinary: Negative for difficulty urinating, dysuria, frequency, vaginal bleeding and vaginal discharge. Musculoskeletal: Negative for back pain, joint swelling, myalgias, neck pain and neck stiffness. Skin: Negative for rash. Neurological: Negative for dizziness, weakness and headaches. Hematological: Negative for adenopathy. Does not bruise/bleed easily. Psychiatric/Behavioral: Negative for confusion, self-injury and suicidal ideas.          PAST MEDICAL HISTORY    has a past medical history of Asthma and MD  11/11/18 1048

## 2018-11-29 ENCOUNTER — APPOINTMENT (OUTPATIENT)
Dept: GENERAL RADIOLOGY | Facility: CLINIC | Age: 23
End: 2018-11-29
Payer: COMMERCIAL

## 2018-11-29 ENCOUNTER — HOSPITAL ENCOUNTER (EMERGENCY)
Facility: CLINIC | Age: 23
Discharge: HOME | End: 2018-11-29
Attending: EMERGENCY MEDICINE
Payer: COMMERCIAL

## 2018-11-29 VITALS
WEIGHT: 130 LBS | BODY MASS INDEX: 21.66 KG/M2 | SYSTOLIC BLOOD PRESSURE: 130 MMHG | DIASTOLIC BLOOD PRESSURE: 72 MMHG | HEART RATE: 69 BPM | OXYGEN SATURATION: 99 % | HEIGHT: 65 IN | RESPIRATION RATE: 14 BRPM | TEMPERATURE: 98.4 F

## 2018-11-29 DIAGNOSIS — R10.84 GENERALIZED ABDOMINAL PAIN: Primary | ICD-10-CM

## 2018-11-29 LAB
-: NORMAL
ABSOLUTE EOS #: 0 K/UL (ref 0–0.4)
ABSOLUTE IMMATURE GRANULOCYTE: ABNORMAL K/UL (ref 0–0.3)
ABSOLUTE LYMPH #: 1.8 K/UL (ref 1–4.8)
ABSOLUTE MONO #: 0.5 K/UL (ref 0.1–1.4)
ALBUMIN SERPL-MCNC: 4.1 G/DL (ref 3.5–5.2)
ALBUMIN/GLOBULIN RATIO: 1.3 (ref 1–2.5)
ALP BLD-CCNC: 44 U/L (ref 35–104)
ALT SERPL-CCNC: 13 U/L (ref 5–33)
AMORPHOUS: NORMAL
ANION GAP SERPL CALCULATED.3IONS-SCNC: 10 MMOL/L (ref 9–17)
AST SERPL-CCNC: 28 U/L
BACTERIA: NORMAL
BASOPHILS # BLD: 1 % (ref 0–2)
BASOPHILS ABSOLUTE: 0 K/UL (ref 0–0.2)
BILIRUB SERPL-MCNC: <0.1 MG/DL (ref 0.3–1.2)
BILIRUBIN URINE: NEGATIVE
BUN BLDV-MCNC: 12 MG/DL (ref 6–20)
BUN/CREAT BLD: ABNORMAL (ref 9–20)
CALCIUM SERPL-MCNC: 9.3 MG/DL (ref 8.6–10.4)
CASTS UA: NORMAL /LPF (ref 0–2)
CHLORIDE BLD-SCNC: 106 MMOL/L (ref 98–107)
CO2: 25 MMOL/L (ref 20–31)
COLOR: YELLOW
COMMENT UA: ABNORMAL
CREAT SERPL-MCNC: 0.8 MG/DL (ref 0.5–0.9)
CRYSTALS, UA: NORMAL /HPF
DIFFERENTIAL TYPE: ABNORMAL
EOSINOPHILS RELATIVE PERCENT: 1 % (ref 1–4)
EPITHELIAL CELLS UA: NORMAL /HPF (ref 0–5)
GFR AFRICAN AMERICAN: >60 ML/MIN
GFR NON-AFRICAN AMERICAN: >60 ML/MIN
GFR SERPL CREATININE-BSD FRML MDRD: ABNORMAL ML/MIN/{1.73_M2}
GFR SERPL CREATININE-BSD FRML MDRD: ABNORMAL ML/MIN/{1.73_M2}
GLUCOSE BLD-MCNC: 103 MG/DL (ref 70–99)
GLUCOSE URINE: NEGATIVE
HCG QUALITATIVE: NEGATIVE
HCT VFR BLD CALC: 38.8 % (ref 36–46)
HEMOGLOBIN: 12.8 G/DL (ref 12–16)
IMMATURE GRANULOCYTES: ABNORMAL %
KETONES, URINE: NEGATIVE
LEUKOCYTE ESTERASE, URINE: ABNORMAL
LIPASE: 36 U/L (ref 13–60)
LYMPHOCYTES # BLD: 23 % (ref 25–45)
MCH RBC QN AUTO: 29.6 PG (ref 26–34)
MCHC RBC AUTO-ENTMCNC: 32.9 G/DL (ref 31–37)
MCV RBC AUTO: 89.9 FL (ref 80–100)
MONOCYTES # BLD: 6 % (ref 2–8)
MUCUS: NORMAL
NITRITE, URINE: NEGATIVE
NRBC AUTOMATED: ABNORMAL PER 100 WBC
OTHER OBSERVATIONS UA: NORMAL
PDW BLD-RTO: 14.5 % (ref 12.5–15.4)
PH UA: 6 (ref 5–8)
PLATELET # BLD: 301 K/UL (ref 140–450)
PLATELET ESTIMATE: ABNORMAL
PMV BLD AUTO: 8.1 FL (ref 6–12)
POTASSIUM SERPL-SCNC: 4 MMOL/L (ref 3.7–5.3)
PROTEIN UA: NEGATIVE
RBC # BLD: 4.31 M/UL (ref 4–5.2)
RBC # BLD: ABNORMAL 10*6/UL
RBC UA: NORMAL /HPF (ref 0–2)
RENAL EPITHELIAL, UA: NORMAL /HPF
SEG NEUTROPHILS: 69 % (ref 34–64)
SEGMENTED NEUTROPHILS ABSOLUTE COUNT: 5.4 K/UL (ref 1.8–7.7)
SODIUM BLD-SCNC: 141 MMOL/L (ref 135–144)
SPECIFIC GRAVITY UA: 1.02 (ref 1–1.03)
TOTAL PROTEIN: 7.2 G/DL (ref 6.4–8.3)
TRICHOMONAS: NORMAL
TURBIDITY: CLEAR
URINE HGB: NEGATIVE
UROBILINOGEN, URINE: NORMAL
WBC # BLD: 7.8 K/UL (ref 4.5–13.5)
WBC # BLD: ABNORMAL 10*3/UL
WBC UA: NORMAL /HPF (ref 0–5)
YEAST: NORMAL

## 2018-11-29 PROCEDURE — 96374 THER/PROPH/DIAG INJ IV PUSH: CPT

## 2018-11-29 PROCEDURE — 99283 EMERGENCY DEPT VISIT LOW MDM: CPT

## 2018-11-29 PROCEDURE — 80053 COMPREHEN METABOLIC PANEL: CPT

## 2018-11-29 PROCEDURE — 87086 URINE CULTURE/COLONY COUNT: CPT

## 2018-11-29 PROCEDURE — 96361 HYDRATE IV INFUSION ADD-ON: CPT

## 2018-11-29 PROCEDURE — 81001 URINALYSIS AUTO W/SCOPE: CPT

## 2018-11-29 PROCEDURE — 85025 COMPLETE CBC W/AUTO DIFF WBC: CPT

## 2018-11-29 PROCEDURE — 74022 RADEX COMPL AQT ABD SERIES: CPT

## 2018-11-29 PROCEDURE — 2580000003 HC RX 258: Performed by: EMERGENCY MEDICINE

## 2018-11-29 PROCEDURE — 86403 PARTICLE AGGLUT ANTBDY SCRN: CPT

## 2018-11-29 PROCEDURE — 83690 ASSAY OF LIPASE: CPT

## 2018-11-29 PROCEDURE — 84703 CHORIONIC GONADOTROPIN ASSAY: CPT

## 2018-11-29 PROCEDURE — 6360000002 HC RX W HCPCS: Performed by: EMERGENCY MEDICINE

## 2018-11-29 RX ORDER — IBUPROFEN 800 MG/1
800 TABLET ORAL EVERY 8 HOURS PRN
Qty: 30 TABLET | Refills: 0 | Status: SHIPPED | OUTPATIENT
Start: 2018-11-29 | End: 2019-04-06

## 2018-11-29 RX ORDER — 0.9 % SODIUM CHLORIDE 0.9 %
1000 INTRAVENOUS SOLUTION INTRAVENOUS ONCE
Status: COMPLETED | OUTPATIENT
Start: 2018-11-29 | End: 2018-11-29

## 2018-11-29 RX ORDER — ONDANSETRON 4 MG/1
4 TABLET, ORALLY DISINTEGRATING ORAL EVERY 8 HOURS PRN
Qty: 20 TABLET | Refills: 0 | Status: SHIPPED | OUTPATIENT
Start: 2018-11-29 | End: 2019-04-06

## 2018-11-29 RX ORDER — ONDANSETRON 2 MG/ML
4 INJECTION INTRAMUSCULAR; INTRAVENOUS ONCE
Status: COMPLETED | OUTPATIENT
Start: 2018-11-29 | End: 2018-11-29

## 2018-11-29 RX ADMIN — ONDANSETRON 4 MG: 2 INJECTION INTRAMUSCULAR; INTRAVENOUS at 14:01

## 2018-11-29 RX ADMIN — SODIUM CHLORIDE 1000 ML: 9 INJECTION, SOLUTION INTRAVENOUS at 14:01

## 2018-11-29 ASSESSMENT — PAIN SCALES - GENERAL: PAINLEVEL_OUTOF10: 0

## 2018-11-29 ASSESSMENT — ENCOUNTER SYMPTOMS
BACK PAIN: 0
BLOOD IN STOOL: 0
NAUSEA: 1
EYE PAIN: 0
COUGH: 0
ABDOMINAL PAIN: 1
VOMITING: 1
SHORTNESS OF BREATH: 0
CONSTIPATION: 0
DIARRHEA: 1

## 2018-11-29 ASSESSMENT — PAIN DESCRIPTION - ORIENTATION: ORIENTATION: RIGHT;LOWER;MID

## 2018-11-29 ASSESSMENT — PAIN DESCRIPTION - DESCRIPTORS: DESCRIPTORS: THROBBING

## 2018-11-29 ASSESSMENT — PAIN DESCRIPTION - LOCATION: LOCATION: BACK

## 2018-11-29 ASSESSMENT — PAIN DESCRIPTION - PAIN TYPE: TYPE: ACUTE PAIN

## 2018-11-29 ASSESSMENT — PAIN DESCRIPTION - DIRECTION: RADIATING_TOWARDS: RIGHT FLANK

## 2018-11-29 ASSESSMENT — PAIN DESCRIPTION - FREQUENCY: FREQUENCY: INTERMITTENT

## 2018-11-29 NOTE — ED NOTES
Pt states that nausea has resolved, given ice water.  She has no requests or complaints, call light within reach     Amada Luong RN  11/29/18 8317

## 2018-11-29 NOTE — ED PROVIDER NOTES
Inhale 2 puffs into the lungs every 6 hours as needed for Wheezing or Shortness of Breath    CHOLECALCIFEROL (VITAMIN D PO)    Take by mouth    DOCUSATE SODIUM (COLACE) 100 MG CAPSULE    Take 100 mg by mouth 2 times daily    FERROUS SULFATE 325 (65 FE) MG TABLET    Take 325 mg by mouth daily (with breakfast)    IBUPROFEN (ADVIL;MOTRIN) 800 MG TABLET    Take 1 tablet by mouth every 8 hours as needed for Pain       ALLERGIES     is allergic to latex. FAMILY HISTORY     indicated that the status of her maternal grandmother is unknown. She indicated that the status of her paternal grandmother is unknown.      family history includes Cancer in her maternal grandmother; Diabetes in her paternal grandmother. SOCIAL HISTORY      reports that she has quit smoking. She has never used smokeless tobacco. She reports that she drinks about 3.0 oz of alcohol per week . She reports that she does not use drugs. PHYSICAL EXAM     INITIAL VITALS:  height is 5' 5\" (1.651 m) and weight is 59 kg (130 lb). Her oral temperature is 98.4 °F (36.9 °C). Her blood pressure is 130/72 and her pulse is 69. Her respiration is 14 and oxygen saturation is 99%. Physical Exam   Constitutional: She is oriented to person, place, and time. She appears well-developed and well-nourished. HENT:   Head: Normocephalic and atraumatic. Right Ear: External ear normal.   Left Ear: External ear normal.   Mouth/Throat: Oropharynx is clear and moist.   Eyes: Pupils are equal, round, and reactive to light. Conjunctivae and EOM are normal.   Neck: Normal range of motion. Cardiovascular: Normal rate and regular rhythm. Pulmonary/Chest: Effort normal and breath sounds normal.   Abdominal: Soft. Bowel sounds are normal. There is tenderness. Abdomen is soft slightly tender but no masses or rebound   Musculoskeletal: She exhibits no edema or tenderness. Neurological: She is alert and oriented to person, place, and time.    Skin: Skin is warm and 12.5 - 15.4 %    Platelets 156 798 - 194 k/uL    MPV 8.1 6.0 - 12.0 fL    NRBC Automated NOT REPORTED per 100 WBC    Differential Type NOT REPORTED     Seg Neutrophils 69 (H) 34 - 64 %    Lymphocytes 23 (L) 25 - 45 %    Monocytes 6 2 - 8 %    Eosinophils % 1 1 - 4 %    Basophils 1 0 - 2 %    Immature Granulocytes NOT REPORTED 0 %    Segs Absolute 5.40 1.8 - 7.7 k/uL    Absolute Lymph # 1.80 1.0 - 4.8 k/uL    Absolute Mono # 0.50 0.1 - 1.4 k/uL    Absolute Eos # 0.00 0.0 - 0.4 k/uL    Basophils # 0.00 0.0 - 0.2 k/uL    Absolute Immature Granulocyte NOT REPORTED 0.00 - 0.30 k/uL    WBC Morphology NOT REPORTED     RBC Morphology NOT REPORTED     Platelet Estimate NOT REPORTED    Comprehensive Metabolic Panel   Result Value Ref Range    Glucose 103 (H) 70 - 99 mg/dL    BUN 12 6 - 20 mg/dL    CREATININE 0.80 0.50 - 0.90 mg/dL    Bun/Cre Ratio NOT REPORTED 9 - 20    Calcium 9.3 8.6 - 10.4 mg/dL    Sodium 141 135 - 144 mmol/L    Potassium 4.0 3.7 - 5.3 mmol/L    Chloride 106 98 - 107 mmol/L    CO2 25 20 - 31 mmol/L    Anion Gap 10 9 - 17 mmol/L    Alkaline Phosphatase 44 35 - 104 U/L    ALT 13 5 - 33 U/L    AST 28 <32 U/L    Total Bilirubin <0.10 (L) 0.3 - 1.2 mg/dL    Total Protein 7.2 6.4 - 8.3 g/dL    Alb 4.1 3.5 - 5.2 g/dL    Albumin/Globulin Ratio 1.3 1.0 - 2.5    GFR Non-African American >60 >60 mL/min    GFR African American >60 >60 mL/min    GFR Comment          GFR Staging NOT REPORTED    Urinalysis   Result Value Ref Range    Color, UA YELLOW YEL    Turbidity UA CLEAR CLEAR    Glucose, Ur NEGATIVE NEG    Bilirubin Urine NEGATIVE NEG    Ketones, Urine NEGATIVE NEG    Specific Gravity, UA 1.020 1.005 - 1.030    Urine Hgb NEGATIVE NEG    pH, UA 6.0 5.0 - 8.0    Protein, UA NEGATIVE NEG    Urobilinogen, Urine Normal NORM    Nitrite, Urine NEGATIVE NEG    Leukocyte Esterase, Urine TRACE (A) NEG    Urinalysis Comments NOT REPORTED    HCG Qualitative, Serum   Result Value Ref Range    hCG Qual NEGATIVE NEG   Lipase

## 2018-11-30 LAB
CULTURE: ABNORMAL
Lab: ABNORMAL
SPECIMEN DESCRIPTION: ABNORMAL
STATUS: ABNORMAL

## 2018-12-14 ENCOUNTER — HOSPITAL ENCOUNTER (EMERGENCY)
Facility: CLINIC | Age: 23
Discharge: HOME | End: 2018-12-14
Attending: EMERGENCY MEDICINE
Payer: COMMERCIAL

## 2018-12-14 VITALS
TEMPERATURE: 98.1 F | OXYGEN SATURATION: 99 % | HEIGHT: 65 IN | DIASTOLIC BLOOD PRESSURE: 55 MMHG | WEIGHT: 138 LBS | RESPIRATION RATE: 18 BRPM | BODY MASS INDEX: 22.99 KG/M2 | SYSTOLIC BLOOD PRESSURE: 122 MMHG | HEART RATE: 56 BPM

## 2018-12-14 DIAGNOSIS — N39.0 URINARY TRACT INFECTION WITHOUT HEMATURIA, SITE UNSPECIFIED: Primary | ICD-10-CM

## 2018-12-14 LAB
-: ABNORMAL
AMORPHOUS: ABNORMAL
BACTERIA: ABNORMAL
BILIRUBIN URINE: NEGATIVE
CASTS UA: ABNORMAL /LPF (ref 0–2)
COLOR: YELLOW
COMMENT UA: ABNORMAL
CRYSTALS, UA: ABNORMAL /HPF
EPITHELIAL CELLS UA: ABNORMAL /HPF (ref 0–5)
GLUCOSE URINE: NEGATIVE
HCG(URINE) PREGNANCY TEST: NEGATIVE
KETONES, URINE: NEGATIVE
LEUKOCYTE ESTERASE, URINE: ABNORMAL
MUCUS: ABNORMAL
NITRITE, URINE: NEGATIVE
OTHER OBSERVATIONS UA: ABNORMAL
PH UA: 6 (ref 5–8)
PROTEIN UA: NEGATIVE
RBC UA: ABNORMAL /HPF (ref 0–2)
RENAL EPITHELIAL, UA: ABNORMAL /HPF
SPECIFIC GRAVITY UA: 1.01 (ref 1–1.03)
TRICHOMONAS: ABNORMAL
TURBIDITY: CLEAR
URINE HGB: ABNORMAL
UROBILINOGEN, URINE: NORMAL
WBC UA: ABNORMAL /HPF (ref 0–5)
YEAST: ABNORMAL

## 2018-12-14 PROCEDURE — 99283 EMERGENCY DEPT VISIT LOW MDM: CPT

## 2018-12-14 PROCEDURE — 87086 URINE CULTURE/COLONY COUNT: CPT

## 2018-12-14 PROCEDURE — 86403 PARTICLE AGGLUT ANTBDY SCRN: CPT

## 2018-12-14 PROCEDURE — 84703 CHORIONIC GONADOTROPIN ASSAY: CPT

## 2018-12-14 PROCEDURE — 81001 URINALYSIS AUTO W/SCOPE: CPT

## 2018-12-14 RX ORDER — NITROFURANTOIN 25; 75 MG/1; MG/1
100 CAPSULE ORAL 2 TIMES DAILY
Qty: 10 CAPSULE | Refills: 0 | Status: SHIPPED | OUTPATIENT
Start: 2018-12-14 | End: 2018-12-19

## 2018-12-14 RX ORDER — PHENAZOPYRIDINE HYDROCHLORIDE 200 MG/1
200 TABLET, FILM COATED ORAL 3 TIMES DAILY PRN
Qty: 6 TABLET | Refills: 0 | Status: SHIPPED | OUTPATIENT
Start: 2018-12-14 | End: 2018-12-17

## 2018-12-14 ASSESSMENT — ENCOUNTER SYMPTOMS
VOMITING: 0
NAUSEA: 1
ABDOMINAL PAIN: 0

## 2018-12-14 ASSESSMENT — PAIN SCALES - WONG BAKER: WONGBAKER_NUMERICALRESPONSE: 6

## 2018-12-14 NOTE — ED PROVIDER NOTES
Turbidity UA CLEAR CLEAR    Glucose, Ur NEGATIVE NEG    Bilirubin Urine NEGATIVE NEG    Ketones, Urine NEGATIVE NEG    Specific Gravity, UA 1.010 1.005 - 1.030    Urine Hgb SMALL (A) NEG    pH, UA 6.0 5.0 - 8.0    Protein, UA NEGATIVE NEG    Urobilinogen, Urine Normal NORM    Nitrite, Urine NEGATIVE NEG    Leukocyte Esterase, Urine MODERATE (A) NEG    Urinalysis Comments NOT REPORTED    Microscopic Urinalysis   Result Value Ref Range    -          WBC, UA 20 TO 50 0 - 5 /HPF    RBC, UA 0 TO 2 0 - 2 /HPF    Casts UA NOT REPORTED 0 - 2 /LPF    Crystals UA NOT REPORTED NONE /HPF    Epithelial Cells UA 0 TO 2 0 - 5 /HPF    Renal Epithelial, Urine NOT REPORTED 0 /HPF    Bacteria, UA FEW (A) NONE    Mucus, UA NOT REPORTED NONE    Trichomonas, UA NOT REPORTED NONE    Amorphous, UA NOT REPORTED NONE    Other Observations UA Culture ordered based on defined criteria. (A) NREQ    Yeast, UA NOT REPORTED NONE           EMERGENCY DEPARTMENT COURSE:   Vitals:    Vitals:    12/14/18 1532   BP: (!) 122/55   Pulse: 56   Resp: 18   Temp: 98.1 °F (36.7 °C)   TempSrc: Oral   SpO2: 99%   Weight: 62.6 kg (138 lb)   Height: 5' 5\" (1.651 m)     -------------------------  BP: (!) 122/55, Temp: 98.1 °F (36.7 °C), Pulse: 56, Resp: 18      RE-EVALUATION:  UA is consistent with the urinary tract infection. The patient is hemodynamically stable, tolerating by mouth intake. Given this, I do feel able to be followed up as an outpatient I will write prescriptions for Macrobid and peridium recommending that she encourage fluids especially cranberry juice. Return to the ER for worsening symptoms, abdominal pain fever greater than 101, vomiting or other concerns otherwise to follow-up with her family doctor or the family doctor of her choice calling [phone] for an appointment  At this time the patient is without objective evidence of an acute process requiring hospitalization or inpatient management.  They have remained hemodynamically stable throughout their entire ED visit and are stable for discharge with outpatient follow-up. The plan has been discussed in detail and they are aware of the specific conditions for emergent return, as well as the importance of follow-up    I have reviewed the disposition diagnosis with the patient and or their family/guardian. I have answered their questions and given discharge instructions. They voiced understanding of these instructions and did not have any further questions or complaints. PROCEDURES:  None    FINAL IMPRESSION      1. Urinary tract infection without hematuria, site unspecified          DISPOSITION/PLAN   DISPOSITION        CONDITION ON DISPOSITION:   Stable    PATIENT REFERRED TO:    Family Doctor of Choice Calling [phone]  Call in 3 days        DISCHARGE MEDICATIONS:  New Prescriptions    NITROFURANTOIN, MACROCRYSTAL-MONOHYDRATE, (MACROBID) 100 MG CAPSULE    Take 1 capsule by mouth 2 times daily for 5 days    PHENAZOPYRIDINE (PYRIDIUM) 200 MG TABLET    Take 1 tablet by mouth 3 times daily as needed for Pain (bladder spasm/pain)       (Please note that portions of this note were completed with a voicerecognition program.  Efforts were made to edit the dictations but occasionally words are mis-transcribed.)    Ashton MD, F.A.C.E.P.   Attending Emergency Medicine Physician       Magdalene Coronado MD  12/14/18 4285

## 2018-12-15 LAB
CULTURE: ABNORMAL
Lab: ABNORMAL
SPECIMEN DESCRIPTION: ABNORMAL
STATUS: ABNORMAL

## 2019-03-12 ENCOUNTER — HOSPITAL ENCOUNTER (EMERGENCY)
Facility: CLINIC | Age: 24
Discharge: HOME | End: 2019-03-12
Attending: EMERGENCY MEDICINE
Payer: COMMERCIAL

## 2019-03-12 VITALS
WEIGHT: 140 LBS | RESPIRATION RATE: 16 BRPM | DIASTOLIC BLOOD PRESSURE: 68 MMHG | TEMPERATURE: 98.2 F | HEART RATE: 66 BPM | BODY MASS INDEX: 23.32 KG/M2 | SYSTOLIC BLOOD PRESSURE: 113 MMHG | OXYGEN SATURATION: 100 % | HEIGHT: 65 IN

## 2019-03-12 DIAGNOSIS — B37.31 YEAST VAGINITIS: Primary | ICD-10-CM

## 2019-03-12 PROCEDURE — 99283 EMERGENCY DEPT VISIT LOW MDM: CPT

## 2019-03-12 RX ORDER — FLUCONAZOLE 150 MG/1
150 TABLET ORAL ONCE
Qty: 1 TABLET | Refills: 0 | Status: SHIPPED | OUTPATIENT
Start: 2019-03-12 | End: 2019-03-12

## 2019-04-06 ENCOUNTER — HOSPITAL ENCOUNTER (EMERGENCY)
Facility: CLINIC | Age: 24
Discharge: HOME | End: 2019-04-06
Attending: EMERGENCY MEDICINE
Payer: COMMERCIAL

## 2019-04-06 VITALS
TEMPERATURE: 98.7 F | BODY MASS INDEX: 22.49 KG/M2 | SYSTOLIC BLOOD PRESSURE: 110 MMHG | WEIGHT: 135 LBS | DIASTOLIC BLOOD PRESSURE: 52 MMHG | OXYGEN SATURATION: 99 % | RESPIRATION RATE: 20 BRPM | HEART RATE: 77 BPM | HEIGHT: 65 IN

## 2019-04-06 DIAGNOSIS — K52.9 GASTROENTERITIS: Primary | ICD-10-CM

## 2019-04-06 LAB
-: ABNORMAL
ABSOLUTE EOS #: 0 K/UL (ref 0–0.4)
ABSOLUTE IMMATURE GRANULOCYTE: ABNORMAL K/UL (ref 0–0.3)
ABSOLUTE LYMPH #: 0.48 K/UL (ref 1–4.8)
ABSOLUTE MONO #: 0.38 K/UL (ref 0.1–1.4)
ALBUMIN SERPL-MCNC: 4.1 G/DL (ref 3.5–5.2)
ALBUMIN/GLOBULIN RATIO: 1.3 (ref 1–2.5)
ALP BLD-CCNC: 44 U/L (ref 35–104)
ALT SERPL-CCNC: 16 U/L (ref 5–33)
AMORPHOUS: ABNORMAL
ANION GAP SERPL CALCULATED.3IONS-SCNC: 12 MMOL/L (ref 9–17)
AST SERPL-CCNC: 27 U/L
BACTERIA: ABNORMAL
BASOPHILS # BLD: 0 % (ref 0–2)
BASOPHILS ABSOLUTE: 0 K/UL (ref 0–0.2)
BILIRUB SERPL-MCNC: 0.3 MG/DL (ref 0.3–1.2)
BILIRUBIN URINE: NEGATIVE
BUN BLDV-MCNC: 17 MG/DL (ref 6–20)
BUN/CREAT BLD: NORMAL (ref 9–20)
CALCIUM SERPL-MCNC: 9.3 MG/DL (ref 8.6–10.4)
CASTS UA: ABNORMAL /LPF (ref 0–2)
CHLORIDE BLD-SCNC: 102 MMOL/L (ref 98–107)
CO2: 22 MMOL/L (ref 20–31)
COLOR: YELLOW
COMMENT UA: ABNORMAL
CREAT SERPL-MCNC: 0.7 MG/DL (ref 0.5–0.9)
CRYSTALS, UA: ABNORMAL /HPF
DIFFERENTIAL TYPE: ABNORMAL
DIRECT EXAM: NORMAL
EOSINOPHILS RELATIVE PERCENT: 0 % (ref 1–4)
EPITHELIAL CELLS UA: ABNORMAL /HPF (ref 0–5)
GFR AFRICAN AMERICAN: >60 ML/MIN
GFR NON-AFRICAN AMERICAN: >60 ML/MIN
GFR SERPL CREATININE-BSD FRML MDRD: NORMAL ML/MIN/{1.73_M2}
GFR SERPL CREATININE-BSD FRML MDRD: NORMAL ML/MIN/{1.73_M2}
GLUCOSE BLD-MCNC: 97 MG/DL (ref 70–99)
GLUCOSE URINE: NEGATIVE
HCG(URINE) PREGNANCY TEST: NEGATIVE
HCT VFR BLD CALC: 40.1 % (ref 36–46)
HEMOGLOBIN: 13.2 G/DL (ref 12–16)
IMMATURE GRANULOCYTES: ABNORMAL %
KETONES, URINE: NEGATIVE
LEUKOCYTE ESTERASE, URINE: ABNORMAL
LYMPHOCYTES # BLD: 5 % (ref 25–45)
Lab: NORMAL
MCH RBC QN AUTO: 30 PG (ref 26–34)
MCHC RBC AUTO-ENTMCNC: 33.1 G/DL (ref 31–37)
MCV RBC AUTO: 90.9 FL (ref 80–100)
MONOCYTES # BLD: 4 % (ref 2–8)
MORPHOLOGY: NORMAL
MUCUS: ABNORMAL
NITRITE, URINE: NEGATIVE
NRBC AUTOMATED: ABNORMAL PER 100 WBC
OTHER OBSERVATIONS UA: ABNORMAL
PDW BLD-RTO: 13.1 % (ref 12.5–15.4)
PH UA: 6 (ref 5–8)
PLATELET # BLD: 256 K/UL (ref 140–450)
PLATELET ESTIMATE: ABNORMAL
PMV BLD AUTO: 8 FL (ref 6–12)
POTASSIUM SERPL-SCNC: 4.2 MMOL/L (ref 3.7–5.3)
PROTEIN UA: NEGATIVE
RBC # BLD: 4.41 M/UL (ref 4–5.2)
RBC # BLD: ABNORMAL 10*6/UL
RBC UA: ABNORMAL /HPF (ref 0–2)
RENAL EPITHELIAL, UA: ABNORMAL /HPF
SEG NEUTROPHILS: 91 % (ref 34–64)
SEGMENTED NEUTROPHILS ABSOLUTE COUNT: 8.64 K/UL (ref 1.8–7.7)
SODIUM BLD-SCNC: 136 MMOL/L (ref 135–144)
SPECIFIC GRAVITY UA: 1.02 (ref 1–1.03)
SPECIMEN DESCRIPTION: NORMAL
TOTAL PROTEIN: 7.2 G/DL (ref 6.4–8.3)
TRICHOMONAS: ABNORMAL
TURBIDITY: CLEAR
URINE HGB: NEGATIVE
UROBILINOGEN, URINE: NORMAL
WBC # BLD: 9.5 K/UL (ref 4.5–13.5)
WBC # BLD: ABNORMAL 10*3/UL
WBC UA: ABNORMAL /HPF (ref 0–5)
YEAST: ABNORMAL

## 2019-04-06 PROCEDURE — 87086 URINE CULTURE/COLONY COUNT: CPT

## 2019-04-06 PROCEDURE — 2580000003 HC RX 258: Performed by: EMERGENCY MEDICINE

## 2019-04-06 PROCEDURE — 36415 COLL VENOUS BLD VENIPUNCTURE: CPT

## 2019-04-06 PROCEDURE — 96374 THER/PROPH/DIAG INJ IV PUSH: CPT

## 2019-04-06 PROCEDURE — 80053 COMPREHEN METABOLIC PANEL: CPT

## 2019-04-06 PROCEDURE — 84703 CHORIONIC GONADOTROPIN ASSAY: CPT

## 2019-04-06 PROCEDURE — 2500000003 HC RX 250 WO HCPCS: Performed by: EMERGENCY MEDICINE

## 2019-04-06 PROCEDURE — 87804 INFLUENZA ASSAY W/OPTIC: CPT

## 2019-04-06 PROCEDURE — 81001 URINALYSIS AUTO W/SCOPE: CPT

## 2019-04-06 PROCEDURE — 96375 TX/PRO/DX INJ NEW DRUG ADDON: CPT

## 2019-04-06 PROCEDURE — 99284 EMERGENCY DEPT VISIT MOD MDM: CPT

## 2019-04-06 PROCEDURE — 85025 COMPLETE CBC W/AUTO DIFF WBC: CPT

## 2019-04-06 PROCEDURE — 6360000002 HC RX W HCPCS: Performed by: EMERGENCY MEDICINE

## 2019-04-06 RX ORDER — 0.9 % SODIUM CHLORIDE 0.9 %
1000 INTRAVENOUS SOLUTION INTRAVENOUS ONCE
Status: COMPLETED | OUTPATIENT
Start: 2019-04-06 | End: 2019-04-06

## 2019-04-06 RX ORDER — KETOROLAC TROMETHAMINE 30 MG/ML
30 INJECTION, SOLUTION INTRAMUSCULAR; INTRAVENOUS ONCE
Status: COMPLETED | OUTPATIENT
Start: 2019-04-06 | End: 2019-04-06

## 2019-04-06 RX ORDER — METRONIDAZOLE 500 MG/1
500 TABLET ORAL 2 TIMES DAILY
COMMUNITY
End: 2019-09-27

## 2019-04-06 RX ORDER — ONDANSETRON 2 MG/ML
4 INJECTION INTRAMUSCULAR; INTRAVENOUS ONCE
Status: COMPLETED | OUTPATIENT
Start: 2019-04-06 | End: 2019-04-06

## 2019-04-06 RX ORDER — ONDANSETRON 4 MG/1
4 TABLET, FILM COATED ORAL EVERY 8 HOURS PRN
Qty: 6 TABLET | Refills: 0 | Status: SHIPPED | OUTPATIENT
Start: 2019-04-06 | End: 2019-04-08

## 2019-04-06 RX ADMIN — FAMOTIDINE 20 MG: 10 INJECTION, SOLUTION INTRAVENOUS at 17:31

## 2019-04-06 RX ADMIN — KETOROLAC TROMETHAMINE 30 MG: 30 INJECTION, SOLUTION INTRAMUSCULAR at 17:29

## 2019-04-06 RX ADMIN — SODIUM CHLORIDE 1000 ML: 9 INJECTION, SOLUTION INTRAVENOUS at 17:26

## 2019-04-06 RX ADMIN — ONDANSETRON 4 MG: 2 INJECTION INTRAMUSCULAR; INTRAVENOUS at 17:29

## 2019-04-06 ASSESSMENT — PAIN SCALES - GENERAL
PAINLEVEL_OUTOF10: 4
PAINLEVEL_OUTOF10: 1
PAINLEVEL_OUTOF10: 4

## 2019-04-06 ASSESSMENT — PAIN DESCRIPTION - PAIN TYPE
TYPE: ACUTE PAIN
TYPE: ACUTE PAIN

## 2019-04-06 ASSESSMENT — PAIN DESCRIPTION - FREQUENCY: FREQUENCY: CONTINUOUS

## 2019-04-06 ASSESSMENT — PAIN DESCRIPTION - ORIENTATION: ORIENTATION: MID;LOWER

## 2019-04-06 ASSESSMENT — PAIN DESCRIPTION - LOCATION
LOCATION: ABDOMEN
LOCATION: ABDOMEN

## 2019-04-06 ASSESSMENT — ENCOUNTER SYMPTOMS
ABDOMINAL PAIN: 1
DIARRHEA: 1
VOMITING: 1
SHORTNESS OF BREATH: 0
COUGH: 0

## 2019-04-06 ASSESSMENT — VISUAL ACUITY
OD: 20/30
OU: 20/25
OS: 20/25

## 2019-04-06 ASSESSMENT — PAIN DESCRIPTION - DESCRIPTORS: DESCRIPTORS: SHARP;PINS AND NEEDLES

## 2019-04-06 NOTE — ED PROVIDER NOTES
[facility] ED  [address] [address]  Phone: [phone] ED  eMERGENCY dEPARTMENT eNCOUnter      Pt Name: Kathy Jimenez  MRN: [id number]  [place] 1995  Date of evaluation: 4/6/2019  Provider: Rachelle Deleon Dr       Chief Complaint   Patient presents with    Abdominal Pain     started yesterday at 0200, woke up with a HA and started vomiting. was also having diarrhea. overall having body aches and fatigue.  Emesis    Diarrhea    Dizziness     also having some blurry vision. HISTORY OF PRESENT ILLNESS   (Location/Symptom, Timing/Onset,Context/Setting, Quality, Duration, Modifying Factors, Severity)  Note limiting factors. Kathy Jimenez is a 26 y.o. female who presents to the emergency department for evaluation of vomiting and diarrhea. Patient states that this started yesterday in the middle the night. She woke up with a headache and had some vomiting followed by some diarrhea, body ache and fatigue. Patient states that after many episodes of vomiting and diarrhea she got a little bit of dizziness and her vision felt weird. This slightly improved today but she still has not been able to keep fluids down. No recent travel. Her abdominal pain is in the midepigastric region and is a burning sensation. Patient denies Dysuria or hematuria and does not think she is pregnant at this time. Nursing Notes were reviewed. REVIEW OF SYSTEMS    (2-9systems for level 4, 10 or more for level 5)     Review of Systems   Constitutional: Negative for fever. Eyes: Positive for visual disturbance. Respiratory: Negative for cough and shortness of breath. Cardiovascular: Negative for leg swelling. Gastrointestinal: Positive for abdominal pain, diarrhea and vomiting. Genitourinary: Negative for dysuria and hematuria. Musculoskeletal: Negative for joint swelling. Skin: Negative for rash.    Neurological: Positive for dizziness. Negative for weakness. All other systems reviewed and are negative. Except asnoted above the remainder of the review of systems was reviewed and negative.        PAST MEDICAL HISTORY     Past Medical History:   Diagnosis Date    Asthma     Depression          SURGICAL HISTORY       Past Surgical History:   Procedure Laterality Date    APPENDECTOMY           CURRENT MEDICATIONS     Previous Medications    ALBUTEROL SULFATE  (90 BASE) MCG/ACT INHALER    Inhale 2 puffs into the lungs every 6 hours as needed for Wheezing or Shortness of Breath    DOCUSATE SODIUM (COLACE) 100 MG CAPSULE    Take 100 mg by mouth 2 times daily    FERROUS SULFATE 325 (65 FE) MG TABLET    Take 325 mg by mouth daily (with breakfast)    FLUCONAZOLE (DIFLUCAN PO)    Take by mouth    METRONIDAZOLE (FLAGYL) 500 MG TABLET    Take 500 mg by mouth 2 times daily    NORGESTIMATE-ETH ESTRADIOL (SPRINTEC 28 PO)    Take by mouth       ALLERGIES     Latex    FAMILY HISTORY       Family History   Problem Relation Age of Onset    Diabetes Paternal Grandmother     Cancer Maternal Grandmother           SOCIAL HISTORY       Social History     Socioeconomic History    Marital status: Single     Spouse name: None    Number of children: None    Years of education: None    Highest education level: None   Occupational History    None   Social Needs    Financial resource strain: None    Food insecurity:     Worry: None     Inability: None    Transportation needs:     Medical: None     Non-medical: None   Tobacco Use    Smoking status: Former Smoker    Smokeless tobacco: Never Used    Tobacco comment: was only an occas smoker   Substance and Sexual Activity    Alcohol use: Not Currently     Alcohol/week: 3.0 oz     Types: 5 Cans of beer per week     Comment: every other week    Drug use: No    Sexual activity: Yes     Partners: Male   Lifestyle    Physical activity:     Days per week: None     Minutes per session: None  Stress: None   Relationships    Social connections:     Talks on phone: None     Gets together: None     Attends [employer] service: None     Active member of club or organization: None     Attends meetings of clubs or organizations: None     Relationship status: None    Intimate partner violence:     Fear of current or ex partner: None     Emotionally abused: None     Physically abused: None     Forced sexual activity: None   Other Topics Concern    None   Social History Narrative    None       SCREENINGS    Piyush Coma Scale  Eye Opening: Spontaneous  Best Verbal Response: Oriented  Best Motor Response: Obeys commands  Piyush Coma Scale Score: 15        PHYSICAL EXAM    (up to 7 for level 4, 8 or more for level 5)     ED Triage Vitals [04/06/19 1702]   BP Temp Temp Source Pulse Resp SpO2 Height Weight   (!) 110/52 98.7 °F (37.1 °C) Oral 77 20 99 % 5' 5\" (1.651 m) 135 lb (61.2 kg)       Physical Exam   Constitutional: She appears well-developed and well-nourished. No distress. HENT:   Head: Normocephalic and atraumatic. Slightly dry mucous membranes   Eyes: Conjunctivae are normal.   Pupils are equally round and reacting normally. Extraoccular muscles are grossly intact. Neck: Normal range of motion. No tracheal deviation present. Cardiovascular: Normal rate, regular rhythm, normal heart sounds and intact distal pulses. Exam reveals no friction rub. No murmur heard. Pulmonary/Chest: Effort normal and breath sounds normal. No stridor. No respiratory distress. She has no wheezes. She has no rales. Abdominal: Soft. She exhibits no distension and no mass. There is no tenderness. There is no rebound and no guarding. Musculoskeletal: Normal range of motion. She exhibits no edema or deformity. Neurological: She is alert. Answering questions appropriately. No gaze deficit. No gait abnormality. Moving all extremities. Skin: Skin is warm and dry. Psychiatric: She has a normal mood and affect. Judgment normal.   Nursing note and vitals reviewed. EMERGENCY DEPARTMENT COURSE and DIFFERENTIAL DIAGNOSIS/MDM:   Vitals:    Vitals:    04/06/19 1702   BP: (!) 110/52   Pulse: 77   Resp: 20   Temp: 98.7 °F (37.1 °C)   TempSrc: Oral   SpO2: 99%   Weight: 61.2 kg (135 lb)   Height: 5' 5\" (1.651 m)       She presents to the emergency department with the complaint described above. Vital signs are grossly normal she is nontoxic. She has no abdominal pain and no tenderness. Slightly dry mucous membranes but I have low suspicion for severe dehydration at this time. Suspect most likely this is viral syndrome. We will get some routine blood work, urinalysis and pregnancy test.  I'll give her some IV fluids, IV Zofran, IV Pepcid and IV Toradol and reevaluate. DIAGNOSTIC RESULTS     LABS:  Labs Reviewed   CBC WITH AUTO DIFFERENTIAL - Abnormal; Notable for the following components:       Result Value    Seg Neutrophils 91 (*)     Lymphocytes 5 (*)     Eosinophils % 0 (*)     Segs Absolute 8.64 (*)     Absolute Lymph # 0.48 (*)     All other components within normal limits   URINE RT REFLEX TO CULTURE - Abnormal; Notable for the following components:    Leukocyte Esterase, Urine TRACE (*)     All other components within normal limits   MICROSCOPIC URINALYSIS - Abnormal; Notable for the following components:    Bacteria, UA FEW (*)     Other Observations UA Culture ordered based on defined criteria. (*)     All other components within normal limits   RAPID INFLUENZA A/B ANTIGENS   URINE CULTURE CLEAN CATCH   COMPREHENSIVE METABOLIC PANEL   PREGNANCY, URINE       All other labs were within normal range or not returned as of this dictation. RADIOLOGY:  No orders to display     Labs unremarkable. Flu swab negative. Urine unremarkable. Pregnancy negative. Patient improved in ER with no vomiting or diarrhea. Given instructions for gastroenteritis, follow-up information and return to ER information.     At this time the patient is without objective evidence of an acute process requiring hospitalization or inpatient management. They have remained hemodynamically stable throughout their entire ED visit and are stable for discharge with outpatient follow-up. Standard anticipatory guidance given to patient upon discharge. Have given them a specific time frame in which to follow-up and who to follow-up with. I have also advised them that they should return to the emergency department if they get worse, or not getting better or develop any new or concerning symptoms. Patient demonstrates understanding. PROCEDURES:  Unless otherwise noted below, none     Procedures    FINAL IMPRESSION      1. Gastroenteritis          DISPOSITION/PLAN   DISPOSITION Decision To Discharge 04/06/2019 06:09:04 PM      PATIENT REFERRED TO:  your primary doctor  If you do not have a primary care physician or you are looking for a new physician, please contact the following number [phone] to establish one.           DISCHARGE MEDICATIONS:  New Prescriptions    ONDANSETRON (ZOFRAN) 4 MG TABLET    Take 1 tablet by mouth every 8 hours as needed for Nausea or Vomiting          (Please note that portions of this note were completed with a voice recognition program.  Efforts were made to edit the dictations but occasionally words are mis-transcribed.)    Gina Brice DO (electronically signed)  Board Certified Emergency Physician          Gina Brice DO  04/06/19 5407

## 2019-04-07 LAB
CULTURE: NORMAL
Lab: NORMAL
SPECIMEN DESCRIPTION: NORMAL

## 2019-05-16 ENCOUNTER — APPOINTMENT (OUTPATIENT)
Dept: GENERAL RADIOLOGY | Facility: CLINIC | Age: 24
End: 2019-05-16
Payer: COMMERCIAL

## 2019-05-16 ENCOUNTER — HOSPITAL ENCOUNTER (EMERGENCY)
Facility: CLINIC | Age: 24
Discharge: HOME | End: 2019-05-16
Attending: EMERGENCY MEDICINE
Payer: COMMERCIAL

## 2019-05-16 VITALS
RESPIRATION RATE: 15 BRPM | DIASTOLIC BLOOD PRESSURE: 64 MMHG | TEMPERATURE: 98.5 F | OXYGEN SATURATION: 100 % | HEIGHT: 64 IN | BODY MASS INDEX: 22.71 KG/M2 | SYSTOLIC BLOOD PRESSURE: 111 MMHG | HEART RATE: 67 BPM | WEIGHT: 133 LBS

## 2019-05-16 DIAGNOSIS — S60.221A CONTUSION OF RIGHT HAND, INITIAL ENCOUNTER: Primary | ICD-10-CM

## 2019-05-16 DIAGNOSIS — S61.411A LACERATION OF RIGHT HAND WITHOUT FOREIGN BODY, INITIAL ENCOUNTER: ICD-10-CM

## 2019-05-16 PROCEDURE — 6370000000 HC RX 637 (ALT 250 FOR IP): Performed by: EMERGENCY MEDICINE

## 2019-05-16 PROCEDURE — 90715 TDAP VACCINE 7 YRS/> IM: CPT | Performed by: EMERGENCY MEDICINE

## 2019-05-16 PROCEDURE — 73130 X-RAY EXAM OF HAND: CPT

## 2019-05-16 PROCEDURE — 99283 EMERGENCY DEPT VISIT LOW MDM: CPT

## 2019-05-16 PROCEDURE — 90471 IMMUNIZATION ADMIN: CPT | Performed by: EMERGENCY MEDICINE

## 2019-05-16 PROCEDURE — 6360000002 HC RX W HCPCS: Performed by: EMERGENCY MEDICINE

## 2019-05-16 RX ORDER — BACITRACIN, NEOMYCIN, POLYMYXIN B 400; 3.5; 5 [USP'U]/G; MG/G; [USP'U]/G
OINTMENT TOPICAL ONCE
Status: COMPLETED | OUTPATIENT
Start: 2019-05-16 | End: 2019-05-16

## 2019-05-16 RX ADMIN — TETANUS TOXOID, REDUCED DIPHTHERIA TOXOID AND ACELLULAR PERTUSSIS VACCINE, ADSORBED 0.5 ML: 5; 2.5; 8; 8; 2.5 SUSPENSION INTRAMUSCULAR at 09:44

## 2019-05-16 RX ADMIN — NEOMYCIN AND POLYMYXIN B SULFATES AND BACITRACIN ZINC: 400; 3.5; 5 OINTMENT TOPICAL at 10:02

## 2019-05-16 ASSESSMENT — ENCOUNTER SYMPTOMS
COUGH: 0
SHORTNESS OF BREATH: 0
BACK PAIN: 0
NAUSEA: 0
VOMITING: 0

## 2019-05-16 ASSESSMENT — PAIN DESCRIPTION - ORIENTATION: ORIENTATION: RIGHT

## 2019-05-16 ASSESSMENT — PAIN DESCRIPTION - ONSET: ONSET: PROGRESSIVE

## 2019-05-16 ASSESSMENT — PAIN DESCRIPTION - PAIN TYPE: TYPE: ACUTE PAIN

## 2019-05-16 ASSESSMENT — PAIN DESCRIPTION - LOCATION: LOCATION: HAND

## 2019-05-16 ASSESSMENT — PAIN SCALES - GENERAL: PAINLEVEL_OUTOF10: 9

## 2019-05-16 ASSESSMENT — PAIN DESCRIPTION - FREQUENCY: FREQUENCY: CONTINUOUS

## 2019-05-16 ASSESSMENT — PAIN DESCRIPTION - DESCRIPTORS: DESCRIPTORS: THROBBING;STABBING

## 2019-05-16 NOTE — ED PROVIDER NOTES
[facility]  [address] [address]  Phone: [phone]        Pt Name: Kathy Jimenez  MRN: [id number]  [place] 1995  Date of evaluation: 5/16/19      CHIEF COMPLAINT       Chief Complaint   Patient presents with    Hand Injury    Laceration         HISTORY OF PRESENT ILLNESS    Kathy Jimenez is a 26 y.o. female who presents with right hand injury happened last night she punched a picture frame glass front shattered glass suffering injuries to her hand I'm she's not sure last tetanus shot was comes in with hand pain no other injuries      REVIEW OF SYSTEMS         Review of Systems   Constitutional: Negative for chills and fever. Respiratory: Negative for cough and shortness of breath. Gastrointestinal: Negative for nausea and vomiting. Musculoskeletal: Negative for arthralgias and back pain. Right hand pain as described in history of present illness   Skin: Negative for rash and wound. PAST MEDICAL HISTORY    has a past medical history of Asthma and Depression. SURGICAL HISTORY      has a past surgical history that includes Appendectomy. CURRENT MEDICATIONS       Previous Medications    ALBUTEROL SULFATE  (90 BASE) MCG/ACT INHALER    Inhale 2 puffs into the lungs every 6 hours as needed for Wheezing or Shortness of Breath    DOCUSATE SODIUM (COLACE) 100 MG CAPSULE    Take 100 mg by mouth 2 times daily    FERROUS SULFATE 325 (65 FE) MG TABLET    Take 325 mg by mouth daily (with breakfast)    FLUCONAZOLE (DIFLUCAN PO)    Take by mouth    METRONIDAZOLE (FLAGYL) 500 MG TABLET    Take 500 mg by mouth 2 times daily    NORGESTIMATE-ETH ESTRADIOL (SPRINTEC 28 PO)    Take by mouth       ALLERGIES     is allergic to latex. FAMILY HISTORY     indicated that the status of her maternal grandmother is unknown.  She indicated that the status of her paternal grandmother is unknown.     family history includes Cancer in her maternal grandmother; Diabetes in her paternal grandmother. SOCIAL HISTORY      reports that she has quit smoking. She has never used smokeless tobacco. She reports that she drank about 3.0 oz of alcohol per week. She reports that she does not use drugs. PHYSICAL EXAM     INITIAL VITALS:  height is 5' 4\" (1.626 m) and weight is 60.3 kg (133 lb). Her oral temperature is 98.5 °F (36.9 °C). Her blood pressure is 113/69 and her pulse is 65. Her respiration is 15 and oxygen saturation is 100%. Physical Exam   Constitutional: She is oriented to person, place, and time. She appears well-developed and well-nourished. HENT:   Head: Normocephalic and atraumatic. Mouth/Throat: Oropharynx is clear and moist.   Eyes: Pupils are equal, round, and reactive to light. Conjunctivae and EOM are normal.   Neck: Normal range of motion. Cardiovascular: Normal rate and regular rhythm. Pulmonary/Chest: Effort normal and breath sounds normal.   Musculoskeletal: Normal range of motion. She exhibits no edema or tenderness. Patient has abrasions across the third fourth and fifth knuckle there is a very small laceration less than half a centimeter that appears superficial over the fifth knuckle she has good range of motion neurovascular status is intact   Neurological: She is alert and oriented to person, place, and time. Skin: Skin is warm and dry. Psychiatric: She has a normal mood and affect.  Her behavior is normal.         DIFFERENTIAL DIAGNOSIS/ MDM:     Injury to right hand, rule out foreign body rule out fracture no lacerations that are big enough to sew will clean the wound update her tetanus    DIAGNOSTIC RESULTS     EKG: All EKG's are interpreted by the Emergency Department Physician who either signs or Co-signs this chart in the absence of a cardiologist.        RADIOLOGY:   Non-plain film images such as CT, Ultrasound and MRI are read by the radiologist. Plain radiographic images are visualized and the radiologist interpretations are reviewed as follows:        EXAMINATION:   3 XRAY VIEWS OF THE RIGHT HAND       5/16/2019 9:45 am       COMPARISON:   None.       HISTORY:   ORDERING SYSTEM PROVIDED HISTORY: Punched a glass picture frame last night   rule out foreign body or fracture   TECHNOLOGIST PROVIDED HISTORY:   Punched a glass picture frame last night rule out foreign body or fracture   Ordering Physician Provided Reason for Exam: abrasions to right 3rd, 4th, and   5th knuckles and swelling noted to right hand   Acuity: Acute   Type of Exam: Initial   Mechanism of Injury: punched a glass picture frame last night   Relevant Medical/Surgical History: NA       FINDINGS:   No fracture or malalignment identified.  The joint spaces are maintained.  No   discrete soft tissue abnormality identified.           Impression   No fracture or foreign body identified.                 LABS:  No results found for this visit on 05/16/19. EMERGENCY DEPARTMENT COURSE:   Vitals:    Vitals:    05/16/19 0929   BP: 113/69   Pulse: 65   Resp: 15   Temp: 98.5 °F (36.9 °C)   TempSrc: Oral   SpO2: 100%   Weight: 60.3 kg (133 lb)   Height: 5' 4\" (1.626 m)     -------------------------  BP: 113/69, Temp: 98.5 °F (36.9 °C), Pulse: 65, Resp: 15          CONSULTS:      PROCEDURES:  None    FINAL IMPRESSION      1. Contusion of right hand, initial encounter    2. Laceration of right hand without foreign body, initial encounter          DISPOSITION/PLAN       PATIENT REFERRED TO:  Nirmala Arora, APRN - [facility]  [address] [facility]  [phone]    Schedule an appointment as soon as possible for a visit in 3 days        DISCHARGE MEDICATIONS:  New Prescriptions    No medications on file       (Please note that portions of this note were completed with a voice recognition program.  Efforts were made to edit the dictations but occasionally words are mis-transcribed.)    Valdovinos MD, F.A.A.E.M.   Attending Emergency Medicine Physician      Ama Orourke Nancy Asher MD  05/16/19 5802

## 2019-05-16 NOTE — ED NOTES
Pt arrives to ER with c/o right hand injury. She states that she punched a glass picture frame last night around 11pm.  Superficial abrasions noted to right  3,4,5th knuckles and swelling noted to right hand. Pt c/o pain with ROM. Ring removed from the 4th right digit. Pt resting in bed, comfort offered, no concerns no s/s of distress.       Ricardo Coello RN  05/16/19 22 Mony Prajapati RN  05/16/19 22 Mony Prajapati RN  05/16/19 5874

## 2019-06-21 DIAGNOSIS — M25.562 PAIN IN BOTH KNEES, UNSPECIFIED CHRONICITY: Primary | ICD-10-CM

## 2019-06-21 DIAGNOSIS — M25.561 PAIN IN BOTH KNEES, UNSPECIFIED CHRONICITY: Primary | ICD-10-CM

## 2019-06-24 ENCOUNTER — OFFICE VISIT (OUTPATIENT)
Dept: ORTHOPEDIC SURGERY | Age: 24
End: 2019-06-24
Payer: COMMERCIAL

## 2019-06-24 VITALS
BODY MASS INDEX: 23.03 KG/M2 | SYSTOLIC BLOOD PRESSURE: 112 MMHG | WEIGHT: 138.2 LBS | HEART RATE: 66 BPM | DIASTOLIC BLOOD PRESSURE: 69 MMHG | HEIGHT: 65 IN

## 2019-06-24 DIAGNOSIS — M22.2X1 PATELLOFEMORAL SYNDROME OF BOTH KNEES: Primary | ICD-10-CM

## 2019-06-24 DIAGNOSIS — M22.2X2 PATELLOFEMORAL SYNDROME OF BOTH KNEES: Primary | ICD-10-CM

## 2019-06-24 PROCEDURE — 1036F TOBACCO NON-USER: CPT | Performed by: ORTHOPAEDIC SURGERY

## 2019-06-24 PROCEDURE — G8427 DOCREV CUR MEDS BY ELIG CLIN: HCPCS | Performed by: ORTHOPAEDIC SURGERY

## 2019-06-24 PROCEDURE — G8420 CALC BMI NORM PARAMETERS: HCPCS | Performed by: ORTHOPAEDIC SURGERY

## 2019-06-24 PROCEDURE — 99203 OFFICE O/P NEW LOW 30 MIN: CPT | Performed by: ORTHOPAEDIC SURGERY

## 2019-06-24 ASSESSMENT — ENCOUNTER SYMPTOMS
CONSTIPATION: 0
CHEST TIGHTNESS: 0
APNEA: 0
DIARRHEA: 0
SHORTNESS OF BREATH: 0
ABDOMINAL PAIN: 0
NAUSEA: 0
COLOR CHANGE: 0
VOMITING: 0
COUGH: 0
ABDOMINAL DISTENTION: 0

## 2019-06-24 NOTE — PROGRESS NOTES
[facility] AND SPORTS MEDICINE  [facility] [address]  Dept: [phone]  Dept Fax: [phone]          Bilateral Knee - New Patient     Subjective:     Chief Complaint   Patient presents with    Knee Pain     bilateral knee pain, had a bump on the RT knee. Has had pain for approx 10 years. Taking Ibuprofen for pain. Recently diagnosed with Hashimoto. HPI:     Conda Denver presents today for Bilateral knee pain. Patient states that she use to do [employer] when she was younger but now she works out 6 days a week. The pain has been present for 9 years since 2010. The patient recalls no specific injury. The patient has tried elevation, ice, rest, ibuprofen and a brace with minimal improvement. The pain is now described as Achy and Sharp based on activity. There is not pain on weight bearing. The knee has swelled. There is  painful popping and clicking. The knee has not caught or locked up. The knee has not given out. It is not stiff upon arising from sitting. It is painful to go up and down stairs and sit for a prolonged time. The patient has not had a cortisone injection. The patient has not tried a lubrication injection. The patient has not tried physical therapy but does workout 6 times a week. The patient has not had surgery. Review of Systems   Constitutional: Positive for activity change. Negative for appetite change, fatigue and fever. Respiratory: Negative for apnea, cough, chest tightness and shortness of breath. Cardiovascular: Negative for chest pain, palpitations and leg swelling. Gastrointestinal: Negative for abdominal distention, abdominal pain, constipation, diarrhea, nausea and vomiting. Genitourinary: Negative for difficulty urinating, dysuria and hematuria. Musculoskeletal: Positive for arthralgias. Negative for gait problem, joint swelling and myalgias. Skin: Negative for color change and rash. Neurological: Negative for dizziness, weakness, numbness and headaches. Psychiatric/Behavioral: Negative for sleep disturbance. Past Medical History:    Past Medical History:   Diagnosis Date    Asthma     Depression     Hashimoto's thyroiditis      Past Surgical History:    Past Surgical History:   Procedure Laterality Date    APPENDECTOMY       Current Medications:   Current Outpatient Medications   Medication Sig Dispense Refill    Norgestimate-Eth Estradiol (SPRINTEC 28 PO) Take by mouth      albuterol sulfate  (90 Base) MCG/ACT inhaler Inhale 2 puffs into the lungs every 6 hours as needed for Wheezing or Shortness of Breath      metroNIDAZOLE (FLAGYL) 500 MG tablet Take 500 mg by mouth 2 times daily      Fluconazole (DIFLUCAN PO) Take by mouth      ferrous sulfate 325 (65 Fe) MG tablet Take 325 mg by mouth daily (with breakfast)      docusate sodium (COLACE) 100 MG capsule Take 100 mg by mouth 2 times daily       No current facility-administered medications for this visit.       Allergies:    Latex    Social History:   Social History     Socioeconomic History    Marital status: Single     Spouse name: None    Number of children: None    Years of education: None    Highest education level: None   Occupational History    None   Social Needs    Financial resource strain: None    Food insecurity:     Worry: None     Inability: None    Transportation needs:     Medical: None     Non-medical: None   Tobacco Use    Smoking status: Former Smoker    Smokeless tobacco: Never Used    Tobacco comment: was only an occas smoker   Substance and Sexual Activity    Alcohol use: Not Currently     Alcohol/week: 3.0 oz     Types: 5 Cans of beer per week     Comment: every other week    Drug use: No    Sexual activity: Yes     Partners: Male   Lifestyle    Physical activity:     Days per week: None     Minutes per session: None    Stress: None   Relationships    Social connections:     Talks on phone: None     Gets together: None     Attends Restorationist service: None     Active member of club or organization: None     Attends meetings of clubs or organizations: None     Relationship status: None    Intimate partner violence:     Fear of current or ex partner: None     Emotionally abused: None     Physically abused: None     Forced sexual activity: None   Other Topics Concern    None   Social History Narrative    None     Family History:  Family History   Problem Relation Age of Onset    Diabetes Paternal Grandmother     Cancer Maternal Grandmother      I have reviewed the CC, HPI, ROS, PMH, FHX, Social History, and if not present in this note, I have reviewed in the patient's chart. I agree with the documentation provided by other staff and have reviewed their documentation prior to providing my signature indicating agreement. Vitals:   /69   Pulse 66   Ht 5' 5\" (1.651 m)   Wt 138 lb 3.2 oz (62.7 kg)   BMI 23.00 kg/m²  Body mass index is 23 kg/m². Physical Examination:     Orthopedics:    GENERAL: Alert and oriented X3 in no acute distress. SKIN: Intact without lesions or ulcerations. NEURO: Musculoskeletal and axillary nerves intact to sensory and motor testing. VASC: Capillary refill is less than 3 seconds. Bilateral Knee     GEN:  Alert and oriented X 3, in no acute distress. GAIT:  The patient's gait was observed while entering the exam room and was noted to be non antalgic. The extremity is in anatomic alignment. SKIN:  Intact without rashes, lesions, or ulcerations. No obvious deformity or swelling. NEURO:  The patient responds to light touch throughout bilateral LE. Patellar and Achilles reflexes are 2/4. VASC:  The bilateral LE is neurovascularly intact with 2/4 DP and 2/4 PT pulses. Brisk capillary refill. ROM: Bilateral: 0/140 degrees. no knee effusion, patella can be translated 1 quadrant medially and 2 quadrants laterally.   MUSC: Good quad tone  LIGAMENT: Lachman's test is Negative with Good endpoint. Anterior drawer Negative. Posterior drawer Negative. There is No varus instability at 0 degrees and No varus instability at 30 degrees. There is No valgus instability at 0 degrees and No valgus instability at 30 degrees. SPECIAL: Luan test is Negative, (+) J-sign noted   PALP: There is no joint line pain, thickening noted over the bursa sac in the right knee. Assessment:     1. Patellofemoral syndrome of both knees        Procedures:    Procedure: no    Radiology:   KNEE X-RAY    4 views of the bilateral knee including AP, bilateral tunnel, and lateral in the upright position, and skyline views reveal anatomic alignment with no fracture or dislocation. No Kellgren grade changes of osteoarthritis (joint space narrowing, osteophyte, subchondral sclerosis, bony deformity/cyst) of the tricompartment(s). No osseous loose bodies. No bony erosion or periosteal reaction. No soft tissue masses. Impression: negative x-rays of the bilateral knee. Plan:   Treatment : I reviewed the X-ray with the patient and I informed her that the knee has no evident fractures. We discussed the etiologies and natural histories of Patellofemoral pain syndrome in the bilateral knee. We discussed the various treatment alternatives including anti-inflammatory medications, physical therapy, injections, further imaging studies and as a last result surgery. During today's visit, I explained to the patient that her knees look good but she should stay away from workouts that may cause her pain in her knee. I then told her that it may help if she goes to physical therapy but I do not know how much that will help because her she already exercises 6 times a week. I also told her that I will give her a NSAID protocol so she may get reduced pain and inflammation in her knee. So at this time, the patient has opted for a physical therapy script and a NSAID protocol.  A physical therapy prescription was given. Patient should return to the clinic in 6 weeks to follow up with Quita Pedersen PA-C, ATC. The patient will call the office immediately with any problems. No orders of the defined types were placed in this encounter. Orders Placed This Encounter   Procedures   [address] Physical Therapy [facility]     Referral Priority:   Routine     Referral Type:   Eval and Treat     Referral Reason:   Specialty Services Required     Requested Specialty:   Physical Therapy     Number of Visits Requested:   1     Fahad DOMINGUEZ am scribing for and in the presence of Liberty Perkins D.O.. 6/30/2019  1:20 PM      Liberty DOMINGUEZ DO, have personally seen this patient, reviewed the chart including history, and imaging if done. I personally  performed the physical exam and obtained any needed additional history. I placed orders, performed or supervised procedures and developed the treatment plan.     Electronically signed by Rody Garcia DO on 6/30/2019 at 1:20 PM

## 2019-06-26 ENCOUNTER — HOSPITAL ENCOUNTER (OUTPATIENT)
Dept: PHYSICAL THERAPY | Facility: CLINIC | Age: 24
Setting detail: THERAPIES SERIES
Discharge: HOME | End: 2019-06-26
Payer: COMMERCIAL

## 2019-06-26 PROCEDURE — 97161 PT EVAL LOW COMPLEX 20 MIN: CPT

## 2019-06-26 PROCEDURE — 97110 THERAPEUTIC EXERCISES: CPT

## 2019-06-26 NOTE — CONSULTS
[] [facility]  Outpatient Rehabilitation &  Therapy  [facility]   [place]: [phone]  F: [phone]     Physical Therapy Lower Extremity Evaluation    Date:  2019  Patient: Conda Denver  : 1995  MRN: [id number]  Physician: Dr. Acacia Man: Madeline Wolfe- 30 visits per year  Medical Diagnosis: Patellofemoral syndrome BLE  Rehab Codes: M22.2X1, M22.2X2  Onset date:    Next Dr's appt.: n/a    Subjective:   CC/HPI: Pt states was told her knee caps \"pull outwards\", notices swelling post workouts or after being on feet all at work (). Works out 6days/wk, focusing on strength training and sprint intervals. Notices increase in pain with squats and lunges. 2 hr leg day 3days/wk. Rknee pain worse than left    PMHx: [] Unremarkable [] Diabetes [] HTN  [] Pacemaker   [] MI/Heart Problems [] Cancer [] Arthritis   [] Other:              [x] Refer to full medical chart  In EPIC     Tests: [x] X-Ray:    [x] MRI:    [] Other:     Medications:  [x] Refer to full medical record [] None [] Other:  Allergies:       [x] Refer to full medical record [] None [] Other:        Martial Status    Home type    Stairs from outside    Stairs inside    [employer] in the [place] -     Job status Full time, will start at [employer] in the Fall    Work Activities/duties  On feet all day    Recreational Activities VERY active, gym 5-6days/wk for 2hrs a day       Pain present? None currently    Location Bilat knees, sup and inf patella   Pain Rating currently 0/10- has not worked out in a few days   Pain at worse 8/10   Pain at best 0/10   Description of pain Numbness, ache   Altered Sensation Denies   What makes it worse Wt bearing, bending   What makes it better Elevation, ice (prn)   Symptom progression Worsening with time.     Sleep              Objective:    ROM  ° A/P STRENGTH    Left Right Left Right   Hip Flex       Ext   3-/5 3-/5   ER       IR ABD   4-/5 4-/5   ADD       Knee Flex       Ext       Ankle DF       PF       INV       EVER                  TESTS (+/-) Left Right Not Tested   Ant. Drawer   []   Post. Drawer   []   Lachmans   []   Valgus Stress - - []   Varus Stress - - []   Luans   []   Apleys Comp.   []   Apleys Dist.   []   Hip Scouring   []   GEMINIs   []   Piriformis   []   Cielos   []   Talor Tilt   []   Pat-Fem Grind   []       OBSERVATION No Deficit Deficit Not Tested Comments   Posture       Forward Head [] [] []    Rounded Shoulders [] [] []    Kyphosis [] [] []    Lordosis [] [] []    Lateral Shift [] [] []    Scoliosis [] [] []    Iliac Crest [] [] []    PSIS [] [] []    ASIS [] [] []    Genu Valgus [] [] []    Genu Varus [] [] []    Genu Recurvatum [] [] []    Pronation [] [] []    Supination [] [] []    Leg Length Discrp [] [] []    Slumped Sitting [] [] []    Palpation [] [x] [] Pain over sup and inf border   Sensation [x] [] []    Edema [x] [] []    Neurological [x] [] []    Patellar Mobility [] [] []    Patellar Orientation [] [] []    Gait [] [] [] Analysis: Significant out-toeing bilaterally. Pes Cavus bilaterally           FUNCTION Normal Difficult Unable   Sitting [x] [] []   Standing [] [x] prolonged []   Ambulation [] [x] []   Groom/Dress [x] [] []   Lift/Carry [x] [] []   Stairs [] [x] []   Bending [] [x] []   Squat [] [x] []   Kneel [] [x] []         BALANCE/PROPRIOCEPTION              [x] Not tested   Single leg stance       R                     L                                PAIN   Eyes open                             Sec. Sec                  . []    Eyes closed                          Sec. Sec                  . []          FUNCTIONAL TESTS PAIN NO PAIN COMMENTS   Heel Tap  4 [x] [] Immediate compensation from hip, R worse than L    6 [] []    8 [] []    Squat [x] [] Bilat, very quad dominant           Flexibility Normal Left tight Right tight   Hip flexor [x] [] [] quad [] [] []   HS [x] [] []   piriformis [x] [] []   ITB [x] [] []   gastroc [x] [] []   Soleus  [x] [] []    [] [] []    [] [] []              Assessment: Pt presents with bilaterally knee pain secondary to significant hip weakness and therefore probability of poor form during wt lifting activities. Pt also presents with significantly decreased eccentric quad control evidenced by inability to properly perform heel taps. STG: (to be met in 6 treatments)  1. ? Pain: Decrease pain levels to 4/10 after working a full day   2. ? ROM: Increase flexibility and AROM limitations throughout to equal bilat to reduce difficulty with ADLs  3. ? Strength: Increase hip abd strength bilaterally to 4+/5   4. ? Function: Pt to demonstrate improved heel tap by performing 5 heel taps on 4\" step with proper form   5. Independent with Home Exercise Programs    LTG: (to be met in 12 treatments)  1. Improve score on assessment tool from  55/80 to 70/80, demonstrating an improvement in ADL function  2. Reduce pain levels to 0/10  3. Pt to demonstrate improved knee eccentric control by performing 10 heel taps on 6\" step with proper form   4. Pt to have increased hip ext and abd strength to 5/5                   Patient goals: Decrease pain to continue working out    Rehab Potential:  [x] Good  [] Fair  [] Poor   Suggested Professional Referral:  [x] No  [] Yes:  Barriers to Goal Achievement[de-identified]  [x] No  [] Yes:  Domestic Concerns:  [x] No  [] Yes:    Pt. Education:  [x] Plans/Goals, Risks/Benefits discussed  [x] Home exercise program    Method of Education: [x] Verbal  [x] Demo  [x] Written  Comprehension of Education:  [x] Verbalizes understanding. [x] Demonstrates understanding. [x] Needs Review. [] Demonstrates/verbalizes understanding of HEP/Ed previously given.     Treatment Plan:  [x] Therapeutic Exercise    [] Aquatic Therapy   [x] Manual Therapy     [] Electrical Stimulation  [x] Instruction in HEP      [] Lumbar/Cervical Traction  [] Neuromuscular Re-education [x] Cold/hotpack  [] Iontophoresis: 4 mg/mL  [x] Vasocompression (GameReady)                    Dexamethasone Sodium  [x] Gait Training             Phosphate 40-80 mAmin         []  Medication allergies reviewed for use of    Dexamethasone Sodium Phosphate 4mg/ml     with iontophoresis treatments. Pt is not allergic. Frequency:  2 x/week for 12 visits    Todays Treatment:    Exercises:  Exercise  Bilat Patellofemoral pain     Reps/ Time Weight/ Level Comments   Bike            HS stretch            Moster walks fwd and lat 2 laps orange    Hip circles 5x orange    Ball on wall squats      Heel tap 5x 4\" Cues for technique, compensates quickly   Reverse lunge            Supine      Bridge 10x     SL Bridge      HS curl on ball       Clamshells 2x10 active Cueing for technique         Prone      Hip ext      Flying squirrel       Other:    Specific Instructions for next treatment: Progress hip/knee ecc strengthening as tolerated. Watch form    Evaluation Complexity:  History (Personal factors, comorbidities) [x] 0 [] 1-2 [] 3+   Exam (limitations, restrictions) [x] 1-2 [] 3 [] 4+   Clinical presentation (progression) [x] Stable [] Evolving  [] Unstable   Decision Making [x] Low [] Moderate [] High    [x] Low Complexity [] Moderate Complexity [] High Complexity       Treatment Charges: Mins Units   [x] Evaluation       [x]  Low       []  Moderate       []  High 20 1   []  Modalities     [x]  Ther Exercise 25 2   []  Manual Therapy     []  Ther Activities     []  Aquatics     []  Vasocompression     []  Other 45 3     TOTAL TREATMENT TIME: 45    Time in: 9310   Time Out:1130    Electronically signed by: Marsha Marrufo PT        Physician Signature:________________________________Date:__________________  By signing above or cosigning this note, I have reviewed this plan of care and certify a need for medically necessary rehabilitation services.      *PLEASE SIGN ABOVE AND

## 2019-07-01 ENCOUNTER — HOSPITAL ENCOUNTER (OUTPATIENT)
Dept: PHYSICAL THERAPY | Facility: CLINIC | Age: 24
Setting detail: THERAPIES SERIES
Discharge: HOME | End: 2019-07-01
Payer: COMMERCIAL

## 2019-07-01 NOTE — FLOWSHEET NOTE
[] [facility]  [address]    [phone]  F: [phone]   [] [address]  [facility]   [address]  P: [phone]  F: [phone]  [] [place]  [address]  P: [phone]  F: [phone]   [x] [address]   [place]: [phone]  F: [phone]  [] [address]  [place]: [phone]   F: [phone]     Physical Therapy Cancel/No Show note    Date: 2019  Patient: Aline Herrera  : 1995  MRN: [id number]    Cancels/No Shows to date: 1    For today's appointment patient:    []  Cancelled    [] Rescheduled appointment    [x] No-show     Reason given by patient:    []  Patient ill    []  Conflicting appointment    [] No transportation      [] Conflict with work    [] No reason given    [] Weather related    [] Other:      Comments:        [] Next appointment was confirmed    Electronically signed by: Deider Ruiz

## 2019-07-03 ENCOUNTER — HOSPITAL ENCOUNTER (OUTPATIENT)
Dept: PHYSICAL THERAPY | Facility: CLINIC | Age: 24
Setting detail: THERAPIES SERIES
Discharge: HOME | End: 2019-07-03
Payer: COMMERCIAL

## 2019-08-04 ENCOUNTER — HOSPITAL ENCOUNTER (EMERGENCY)
Facility: CLINIC | Age: 24
Discharge: HOME | End: 2019-08-04
Attending: EMERGENCY MEDICINE
Payer: COMMERCIAL

## 2019-08-04 VITALS
RESPIRATION RATE: 16 BRPM | OXYGEN SATURATION: 98 % | DIASTOLIC BLOOD PRESSURE: 76 MMHG | HEART RATE: 86 BPM | SYSTOLIC BLOOD PRESSURE: 116 MMHG | WEIGHT: 132 LBS | TEMPERATURE: 98.4 F | BODY MASS INDEX: 21.97 KG/M2

## 2019-08-04 DIAGNOSIS — N76.0 ACUTE VAGINITIS: Primary | ICD-10-CM

## 2019-08-04 DIAGNOSIS — B37.31 YEAST INFECTION INVOLVING THE VAGINA AND SURROUNDING AREA: ICD-10-CM

## 2019-08-04 PROCEDURE — 99282 EMERGENCY DEPT VISIT SF MDM: CPT

## 2019-08-04 RX ORDER — FLUCONAZOLE 100 MG/1
100 TABLET ORAL DAILY
Qty: 7 TABLET | Refills: 0 | Status: SHIPPED | OUTPATIENT
Start: 2019-08-04 | End: 2019-08-11

## 2019-08-04 RX ORDER — METRONIDAZOLE 500 MG/1
500 TABLET ORAL 2 TIMES DAILY
Qty: 14 TABLET | Refills: 0 | Status: SHIPPED | OUTPATIENT
Start: 2019-08-04 | End: 2019-09-27

## 2019-08-04 ASSESSMENT — ENCOUNTER SYMPTOMS
STRIDOR: 0
VOMITING: 0
SORE THROAT: 0
SHORTNESS OF BREATH: 0
DIARRHEA: 0
WHEEZING: 0
COLOR CHANGE: 0
EYE REDNESS: 0
EYE DISCHARGE: 0
CONSTIPATION: 0
ABDOMINAL PAIN: 0
COUGH: 0
NAUSEA: 0
EYE PAIN: 0

## 2019-09-27 ENCOUNTER — HOSPITAL ENCOUNTER (EMERGENCY)
Facility: CLINIC | Age: 24
Discharge: HOME | End: 2019-09-27
Attending: EMERGENCY MEDICINE
Payer: COMMERCIAL

## 2019-09-27 ENCOUNTER — APPOINTMENT (OUTPATIENT)
Dept: GENERAL RADIOLOGY | Facility: CLINIC | Age: 24
End: 2019-09-27
Payer: COMMERCIAL

## 2019-09-27 ENCOUNTER — APPOINTMENT (OUTPATIENT)
Dept: CT IMAGING | Facility: CLINIC | Age: 24
End: 2019-09-27
Payer: COMMERCIAL

## 2019-09-27 VITALS
BODY MASS INDEX: 22.66 KG/M2 | SYSTOLIC BLOOD PRESSURE: 108 MMHG | RESPIRATION RATE: 17 BRPM | OXYGEN SATURATION: 98 % | HEART RATE: 67 BPM | DIASTOLIC BLOOD PRESSURE: 51 MMHG | WEIGHT: 136 LBS | HEIGHT: 65 IN | TEMPERATURE: 97.9 F

## 2019-09-27 DIAGNOSIS — R09.1 PLEURISY: Primary | ICD-10-CM

## 2019-09-27 LAB
ABSOLUTE EOS #: 0.1 K/UL (ref 0–0.4)
ABSOLUTE IMMATURE GRANULOCYTE: NORMAL K/UL (ref 0–0.3)
ABSOLUTE LYMPH #: 2.8 K/UL (ref 1–4.8)
ABSOLUTE MONO #: 0.6 K/UL (ref 0.1–1.2)
ANION GAP SERPL CALCULATED.3IONS-SCNC: 13 MMOL/L (ref 9–17)
BASOPHILS # BLD: 1 % (ref 0–2)
BASOPHILS ABSOLUTE: 0 K/UL (ref 0–0.2)
BUN BLDV-MCNC: 17 MG/DL (ref 6–20)
BUN/CREAT BLD: NORMAL (ref 9–20)
CALCIUM SERPL-MCNC: 9.5 MG/DL (ref 8.6–10.4)
CHLORIDE BLD-SCNC: 104 MMOL/L (ref 98–107)
CO2: 23 MMOL/L (ref 20–31)
CREAT SERPL-MCNC: 0.8 MG/DL (ref 0.5–0.9)
D-DIMER QUANTITATIVE: 0.75 MG/L FEU
DIFFERENTIAL TYPE: NORMAL
EOSINOPHILS RELATIVE PERCENT: 1 % (ref 1–4)
GFR AFRICAN AMERICAN: >60 ML/MIN
GFR NON-AFRICAN AMERICAN: >60 ML/MIN
GFR SERPL CREATININE-BSD FRML MDRD: NORMAL ML/MIN/{1.73_M2}
GFR SERPL CREATININE-BSD FRML MDRD: NORMAL ML/MIN/{1.73_M2}
GLUCOSE BLD-MCNC: 98 MG/DL (ref 70–99)
HCG QUALITATIVE: NEGATIVE
HCT VFR BLD CALC: 39.2 % (ref 36–46)
HEMOGLOBIN: 12.9 G/DL (ref 12–16)
IMMATURE GRANULOCYTES: NORMAL %
LYMPHOCYTES # BLD: 33 % (ref 24–44)
MCH RBC QN AUTO: 30 PG (ref 26–34)
MCHC RBC AUTO-ENTMCNC: 33 G/DL (ref 31–37)
MCV RBC AUTO: 91.1 FL (ref 80–100)
MONOCYTES # BLD: 7 % (ref 2–11)
NRBC AUTOMATED: NORMAL PER 100 WBC
PDW BLD-RTO: 13.1 % (ref 12.5–15.4)
PLATELET # BLD: 298 K/UL (ref 140–450)
PLATELET ESTIMATE: NORMAL
PMV BLD AUTO: 7.3 FL (ref 6–12)
POTASSIUM SERPL-SCNC: 3.9 MMOL/L (ref 3.7–5.3)
RBC # BLD: 4.3 M/UL (ref 4–5.2)
RBC # BLD: NORMAL 10*6/UL
SEG NEUTROPHILS: 58 % (ref 36–66)
SEGMENTED NEUTROPHILS ABSOLUTE COUNT: 4.8 K/UL (ref 1.8–7.7)
SODIUM BLD-SCNC: 140 MMOL/L (ref 135–144)
THYROXINE, FREE: 1.5 NG/DL (ref 0.93–1.7)
TROPONIN INTERP: NORMAL
TROPONIN T: NORMAL NG/ML
TROPONIN, HIGH SENSITIVITY: <6 NG/L (ref 0–14)
TSH SERPL DL<=0.05 MIU/L-ACNC: 1.23 MIU/L (ref 0.3–5)
WBC # BLD: 8.3 K/UL (ref 3.5–11)
WBC # BLD: NORMAL 10*3/UL

## 2019-09-27 PROCEDURE — 80048 BASIC METABOLIC PNL TOTAL CA: CPT

## 2019-09-27 PROCEDURE — 6370000000 HC RX 637 (ALT 250 FOR IP): Performed by: EMERGENCY MEDICINE

## 2019-09-27 PROCEDURE — 71046 X-RAY EXAM CHEST 2 VIEWS: CPT

## 2019-09-27 PROCEDURE — 85025 COMPLETE CBC W/AUTO DIFF WBC: CPT

## 2019-09-27 PROCEDURE — 71260 CT THORAX DX C+: CPT

## 2019-09-27 PROCEDURE — 85379 FIBRIN DEGRADATION QUANT: CPT

## 2019-09-27 PROCEDURE — 2580000003 HC RX 258: Performed by: EMERGENCY MEDICINE

## 2019-09-27 PROCEDURE — 84484 ASSAY OF TROPONIN QUANT: CPT

## 2019-09-27 PROCEDURE — 99285 EMERGENCY DEPT VISIT HI MDM: CPT

## 2019-09-27 PROCEDURE — 84703 CHORIONIC GONADOTROPIN ASSAY: CPT

## 2019-09-27 PROCEDURE — 84439 ASSAY OF FREE THYROXINE: CPT

## 2019-09-27 PROCEDURE — 93005 ELECTROCARDIOGRAM TRACING: CPT | Performed by: EMERGENCY MEDICINE

## 2019-09-27 PROCEDURE — 6360000004 HC RX CONTRAST MEDICATION: Performed by: EMERGENCY MEDICINE

## 2019-09-27 PROCEDURE — 36415 COLL VENOUS BLD VENIPUNCTURE: CPT

## 2019-09-27 PROCEDURE — 84443 ASSAY THYROID STIM HORMONE: CPT

## 2019-09-27 RX ORDER — 0.9 % SODIUM CHLORIDE 0.9 %
70 INTRAVENOUS SOLUTION INTRAVENOUS ONCE
Status: COMPLETED | OUTPATIENT
Start: 2019-09-27 | End: 2019-09-27

## 2019-09-27 RX ORDER — SODIUM CHLORIDE 0.9 % (FLUSH) 0.9 %
10 SYRINGE (ML) INJECTION PRN
Status: DISCONTINUED | OUTPATIENT
Start: 2019-09-27 | End: 2019-09-27 | Stop reason: HOSPADM

## 2019-09-27 RX ORDER — ASPIRIN 81 MG/1
324 TABLET, CHEWABLE ORAL ONCE
Status: COMPLETED | OUTPATIENT
Start: 2019-09-27 | End: 2019-09-27

## 2019-09-27 RX ORDER — M-VIT,TX,IRON,MINS/CALC/FOLIC 27MG-0.4MG
1 TABLET ORAL DAILY
COMMUNITY
End: 2019-10-06

## 2019-09-27 RX ADMIN — ASPIRIN 81 MG 324 MG: 81 TABLET ORAL at 16:48

## 2019-09-27 RX ADMIN — Medication 10 ML: at 17:26

## 2019-09-27 RX ADMIN — SODIUM CHLORIDE 70 ML: 9 INJECTION, SOLUTION INTRAVENOUS at 17:26

## 2019-09-27 RX ADMIN — IOPAMIDOL 80 ML: 755 INJECTION, SOLUTION INTRAVENOUS at 17:25

## 2019-09-27 ASSESSMENT — PAIN DESCRIPTION - DESCRIPTORS: DESCRIPTORS: TIGHTNESS;STABBING

## 2019-09-27 ASSESSMENT — PAIN SCALES - GENERAL
PAINLEVEL_OUTOF10: 0
PAINLEVEL_OUTOF10: 4

## 2019-09-27 ASSESSMENT — PAIN DESCRIPTION - LOCATION: LOCATION: CHEST

## 2019-09-28 LAB
EKG ATRIAL RATE: 69 BPM
EKG P AXIS: 63 DEGREES
EKG P-R INTERVAL: 160 MS
EKG Q-T INTERVAL: 430 MS
EKG QRS DURATION: 94 MS
EKG QTC CALCULATION (BAZETT): 460 MS
EKG R AXIS: 80 DEGREES
EKG T AXIS: 51 DEGREES
EKG VENTRICULAR RATE: 69 BPM

## 2019-10-06 ENCOUNTER — HOSPITAL ENCOUNTER (EMERGENCY)
Facility: CLINIC | Age: 24
Discharge: HOME | End: 2019-10-06
Attending: EMERGENCY MEDICINE
Payer: COMMERCIAL

## 2019-10-06 VITALS
SYSTOLIC BLOOD PRESSURE: 117 MMHG | DIASTOLIC BLOOD PRESSURE: 66 MMHG | WEIGHT: 136 LBS | TEMPERATURE: 98.2 F | BODY MASS INDEX: 22.66 KG/M2 | OXYGEN SATURATION: 97 % | HEIGHT: 65 IN | HEART RATE: 66 BPM | RESPIRATION RATE: 16 BRPM

## 2019-10-06 DIAGNOSIS — S05.02XA INJURY OF CONJUNCTIVA AND CORNEAL ABRASION WITHOUT FOREIGN BODY, LEFT EYE, INITIAL ENCOUNTER: Primary | ICD-10-CM

## 2019-10-06 PROCEDURE — 6370000000 HC RX 637 (ALT 250 FOR IP): Performed by: EMERGENCY MEDICINE

## 2019-10-06 PROCEDURE — 99283 EMERGENCY DEPT VISIT LOW MDM: CPT

## 2019-10-06 RX ORDER — TETRACAINE HYDROCHLORIDE 5 MG/ML
1 SOLUTION OPHTHALMIC ONCE
Status: COMPLETED | OUTPATIENT
Start: 2019-10-06 | End: 2019-10-06

## 2019-10-06 RX ORDER — SULFACETAMIDE SODIUM 100 MG/ML
2 SOLUTION/ DROPS OPHTHALMIC
Qty: 5 ML | Refills: 0 | Status: SHIPPED | OUTPATIENT
Start: 2019-10-06 | End: 2019-10-16

## 2019-10-06 RX ADMIN — TETRACAINE HYDROCHLORIDE 1 DROP: 5 SOLUTION OPHTHALMIC at 17:50

## 2019-10-06 RX ADMIN — FLUORESCEIN SODIUM 1 MG: 1 STRIP OPHTHALMIC at 17:51

## 2019-10-06 ASSESSMENT — PAIN DESCRIPTION - DESCRIPTORS: DESCRIPTORS: SHARP;ITCHING

## 2019-10-06 ASSESSMENT — PAIN DESCRIPTION - LOCATION: LOCATION: EYE

## 2019-10-06 ASSESSMENT — PAIN DESCRIPTION - PROGRESSION: CLINICAL_PROGRESSION: GRADUALLY WORSENING

## 2019-10-06 ASSESSMENT — PAIN DESCRIPTION - ONSET: ONSET: SUDDEN

## 2019-10-06 ASSESSMENT — PAIN DESCRIPTION - ORIENTATION: ORIENTATION: LEFT

## 2019-10-06 ASSESSMENT — PAIN SCALES - GENERAL: PAINLEVEL_OUTOF10: 6

## 2019-10-06 ASSESSMENT — PAIN DESCRIPTION - FREQUENCY: FREQUENCY: CONTINUOUS

## 2019-10-06 ASSESSMENT — PAIN DESCRIPTION - PAIN TYPE: TYPE: ACUTE PAIN

## 2020-01-06 ENCOUNTER — HOSPITAL ENCOUNTER (EMERGENCY)
Facility: CLINIC | Age: 25
Discharge: HOME | End: 2020-01-06
Attending: EMERGENCY MEDICINE
Payer: COMMERCIAL

## 2020-01-06 VITALS
RESPIRATION RATE: 16 BRPM | BODY MASS INDEX: 23.66 KG/M2 | WEIGHT: 142 LBS | TEMPERATURE: 98 F | HEART RATE: 75 BPM | SYSTOLIC BLOOD PRESSURE: 127 MMHG | HEIGHT: 65 IN | DIASTOLIC BLOOD PRESSURE: 75 MMHG | OXYGEN SATURATION: 99 %

## 2020-01-06 LAB
-: ABNORMAL
AMORPHOUS: ABNORMAL
BACTERIA: ABNORMAL
BILIRUBIN URINE: NEGATIVE
CASTS UA: ABNORMAL /LPF (ref 0–2)
COLOR: YELLOW
COMMENT UA: ABNORMAL
CRYSTALS, UA: ABNORMAL /HPF
EPITHELIAL CELLS UA: ABNORMAL /HPF (ref 0–5)
GLUCOSE URINE: NEGATIVE
HCG(URINE) PREGNANCY TEST: NEGATIVE
KETONES, URINE: NEGATIVE
LEUKOCYTE ESTERASE, URINE: ABNORMAL
MUCUS: ABNORMAL
NITRITE, URINE: NEGATIVE
OTHER OBSERVATIONS UA: ABNORMAL
PH UA: 5.5 (ref 5–8)
PROTEIN UA: NEGATIVE
RBC UA: ABNORMAL /HPF (ref 0–2)
RENAL EPITHELIAL, UA: ABNORMAL /HPF
SPECIFIC GRAVITY UA: 1.01 (ref 1–1.03)
TRICHOMONAS: ABNORMAL
TURBIDITY: CLEAR
URINE HGB: ABNORMAL
UROBILINOGEN, URINE: NORMAL
WBC UA: ABNORMAL /HPF (ref 0–5)
YEAST: ABNORMAL

## 2020-01-06 PROCEDURE — 99283 EMERGENCY DEPT VISIT LOW MDM: CPT

## 2020-01-06 PROCEDURE — 87086 URINE CULTURE/COLONY COUNT: CPT

## 2020-01-06 PROCEDURE — 81001 URINALYSIS AUTO W/SCOPE: CPT

## 2020-01-06 PROCEDURE — 81025 URINE PREGNANCY TEST: CPT

## 2020-01-06 ASSESSMENT — PAIN DESCRIPTION - PROGRESSION: CLINICAL_PROGRESSION: NOT CHANGED

## 2020-01-06 ASSESSMENT — PAIN DESCRIPTION - FREQUENCY: FREQUENCY: INTERMITTENT

## 2020-01-06 ASSESSMENT — PAIN SCALES - GENERAL: PAINLEVEL_OUTOF10: 10

## 2020-01-06 ASSESSMENT — PAIN DESCRIPTION - ORIENTATION: ORIENTATION: LEFT

## 2020-01-06 ASSESSMENT — PAIN DESCRIPTION - PAIN TYPE: TYPE: ACUTE PAIN

## 2020-01-06 ASSESSMENT — PAIN DESCRIPTION - LOCATION: LOCATION: ABDOMEN
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
## 2020-01-08 LAB
CULTURE: NORMAL
Lab: NORMAL
SPECIMEN DESCRIPTION: NORMAL

## 2020-01-27 ENCOUNTER — HOSPITAL ENCOUNTER (EMERGENCY)
Facility: CLINIC | Age: 25
Discharge: HOME | End: 2020-01-27
Attending: EMERGENCY MEDICINE
Payer: COMMERCIAL

## 2020-01-27 ENCOUNTER — APPOINTMENT (OUTPATIENT)
Dept: GENERAL RADIOLOGY | Facility: CLINIC | Age: 25
End: 2020-01-27
Payer: COMMERCIAL

## 2020-01-27 VITALS
DIASTOLIC BLOOD PRESSURE: 113 MMHG | RESPIRATION RATE: 16 BRPM | BODY MASS INDEX: 23.32 KG/M2 | OXYGEN SATURATION: 98 % | WEIGHT: 140 LBS | HEIGHT: 65 IN | HEART RATE: 87 BPM | SYSTOLIC BLOOD PRESSURE: 128 MMHG | TEMPERATURE: 98.7 F

## 2020-01-27 LAB
DIRECT EXAM: ABNORMAL
DIRECT EXAM: ABNORMAL
DIRECT EXAM: NORMAL
Lab: ABNORMAL
Lab: NORMAL
SPECIMEN DESCRIPTION: ABNORMAL
SPECIMEN DESCRIPTION: NORMAL

## 2020-01-27 PROCEDURE — 87804 INFLUENZA ASSAY W/OPTIC: CPT

## 2020-01-27 PROCEDURE — 99283 EMERGENCY DEPT VISIT LOW MDM: CPT

## 2020-01-27 PROCEDURE — 71046 X-RAY EXAM CHEST 2 VIEWS: CPT

## 2020-01-27 PROCEDURE — 87880 STREP A ASSAY W/OPTIC: CPT

## 2020-01-27 RX ORDER — OSELTAMIVIR PHOSPHATE 75 MG/1
75 CAPSULE ORAL 2 TIMES DAILY
Qty: 10 CAPSULE | Refills: 0 | Status: SHIPPED | OUTPATIENT
Start: 2020-01-27 | End: 2020-02-01

## 2020-01-27 ASSESSMENT — ENCOUNTER SYMPTOMS
COUGH: 1
DIARRHEA: 1
VOMITING: 0
SHORTNESS OF BREATH: 0

## 2020-01-27 NOTE — ED PROVIDER NOTES
[facility]  [address] [facility]  Phone: [phone]        Pt Name: Karen Garza  MRN: [id number]  [place] 1995  Date of evaluation: 1/27/20      CHIEF COMPLAINT     Chief Complaint   Patient presents with    Generalized Body Aches     Started yesterday afternoon    Cough    Fatigue    Nasal Congestion    Diarrhea         HISTORY OF PRESENT ILLNESS  (Location/Symptom, Timing/Onset, Context/Setting, Quality, Duration, Modifying Factors, Severity.)    Karen Garza is a 27 y.o. female who presents with body aches cough congestion. The patient dates that starting yesterday she developed body aches cough congestion fatigue and loose stools no nausea or vomiting no shortness of breath nothing she does makes her symptoms better or worse not knowingly exposed to anybody with similar symptoms still tolerating by mouth intake      REVIEW OF SYSTEMS    (2-9 systems for level 4, 10 or more for level 5)     Review of Systems   HENT: Positive for congestion. Respiratory: Positive for cough. Negative for shortness of breath. Gastrointestinal: Positive for diarrhea. Negative for vomiting. Musculoskeletal: Positive for myalgias. PAST MEDICAL HISTORY    has a past medical history of Asthma, Depression, and Hashimoto's thyroiditis. SURGICAL HISTORY      has a past surgical history that includes Appendectomy. CURRENTMEDICATIONS       Previous Medications    NORGESTIMATE-ETH ESTRADIOL (SPRINTEC 28 PO)    Take by mouth       ALLERGIES     is allergic to latex. FAMILY HISTORY     She indicated that the status of her maternal grandmother is unknown. She indicated that the status of her paternal grandmother is unknown.     family history includes Cancer in her maternal grandmother; Diabetes in her paternal grandmother. SOCIAL HISTORY      reports that she has quit smoking.  She has never used smokeless tobacco. She reports previous alcohol use of about 5.0 standard drinks of alcohol per week. She reports that she does not use drugs. PHYSICAL EXAM    (up to 7 for level 4, 8 or more for level 5)   INITIAL VITALS:  height is 5' 5\" (1.651 m) and weight is 63.5 kg (140 lb). Her oral temperature is 98.7 °F (37.1 °C). Her blood pressure is 128/113 (abnormal) and her pulse is 87. Her respiration is 16 and oxygen saturation is 98%. Physical Exam  Vitals signs and nursing note reviewed. Constitutional:       Appearance: Normal appearance. HENT:      Head: Normocephalic and atraumatic. Right Ear: Tympanic membrane normal.      Left Ear: Tympanic membrane normal.      Mouth/Throat:      Mouth: Mucous membranes are moist.      Pharynx: Oropharynx is clear. Eyes:      Conjunctiva/sclera: Conjunctivae normal.   Neck:      Musculoskeletal: Normal range of motion and neck supple. No neck rigidity or muscular tenderness. Cardiovascular:      Rate and Rhythm: Normal rate and regular rhythm. Pulses: Normal pulses. Heart sounds: Normal heart sounds. Pulmonary:      Effort: Pulmonary effort is normal. No respiratory distress. Breath sounds: Normal breath sounds. No wheezing or rales. Abdominal:      General: There is no distension. Palpations: Abdomen is soft. Tenderness: There is no abdominal tenderness. Musculoskeletal: Normal range of motion. General: No swelling or tenderness. Lymphadenopathy:      Cervical: No cervical adenopathy. Skin:     General: Skin is warm and dry. Findings: No rash. Neurological:      General: No focal deficit present. Mental Status: She is alert.          DIFFERENTIAL DIAGNOSIS/ MDM:     I will check a rapid strep and influenza and a chest x-ray    DIAGNOSTIC RESULTS         RADIOLOGY:  Non-plain film images such as CT, Ultrasound and MRI are read by the radiologist. Plain radiographic images are visualized and the radiologist interpretations are reviewed as follows:         Interpretation per the Radiologist below, if available at the time of this note:    XR CHEST STANDARD (2 VW) (Final result)   Result time 01/27/20 14:37:43   Final result by Abby Galicia MD (01/27/20 14:37:43)                Impression:    Negative chest radiographs            Narrative:    EXAMINATION:  TWO XRAY VIEWS OF THE CHEST    1/27/2020 2:32 pm    COMPARISON:  September 27, 2019    HISTORY:  ORDERING SYSTEM PROVIDED HISTORY: cough  TECHNOLOGIST PROVIDED HISTORY:  cough  Reason for Exam: Pt. C/o cough, congestion, fatigue, diarrhea  Acuity: Acute  Type of Exam: Initial    FINDINGS:  The heart is not enlarged.  No pulmonary venous congestion or edema.  The  lungs are clear of focal lung consolidation or infiltrate.  No pleural  effusion or pneumothorax. Osseous structures are grossly intact. LABS:  Results for orders placed or performed during the hospital encounter of 01/27/20   Rapid influenza A/B antigens   Result Value Ref Range    Specimen Description . NASOPHARYNGEAL SWAB     Special Requests NOT REPORTED     Direct Exam POSITIVE for Influenza A Antigen (A)     Direct Exam NEGATIVE for Influenza B Antigen    Strep Screen Group A Throat   Result Value Ref Range    Specimen Description . THROAT     Special Requests NOT REPORTED     Direct Exam       Rapid Strep A negative. A negative Rapid Group A Strep Screen result does not rule out the possibility of Group A Streptococci in the specimen.  A Group A Strep DNA test is available upon request.             EMERGENCY DEPARTMENT COURSE:   Vitals:    Vitals:    01/27/20 1346   BP: (!) 128/113   Pulse: 87   Resp: 16   Temp: 98.7 °F (37.1 °C)   TempSrc: Oral   SpO2: 98%   Weight: 63.5 kg (140 lb)   Height: 5' 5\" (1.651 m)     -------------------------  BP: (!) 128/113, Temp: 98.7 °F (37.1 °C), Pulse: 87, Resp: 16      RE-EVALUATION:  Patient is noted to have influenza I did discuss the risks and benefits of Tamiflu and she wishes to proceed with the Tamiflu voicerecognition program.  Efforts were made to edit the dictations but occasionally words are mis-transcribed.)    Rodríguez MD, F.A.C.E.P.   Attending Emergency Medicine Physician       Heidi Hubbard MD  01/27/20 5633

## 2020-01-27 NOTE — LETTER
[facility] ED  [address] [facility]  Phone: [phone]               January 27, 2020    Patient: Gilberto Cochran   YOB: 1995   Date of Visit: 1/27/2020       To Whom It May Concern:    Gilberto Cochran was seen and treated in our emergency department on 1/27/2020.  Please excuse Janki Junior from school 1/27 thru 1/30/20      Sincerely,       Elsa Aguila MD         Signature:__________________________________

## 2020-06-22 ENCOUNTER — HOSPITAL ENCOUNTER (EMERGENCY)
Facility: CLINIC | Age: 25
Discharge: HOME | End: 2020-06-22
Attending: EMERGENCY MEDICINE
Payer: COMMERCIAL

## 2020-06-22 VITALS
TEMPERATURE: 98.3 F | SYSTOLIC BLOOD PRESSURE: 134 MMHG | DIASTOLIC BLOOD PRESSURE: 84 MMHG | HEIGHT: 65 IN | BODY MASS INDEX: 24.99 KG/M2 | RESPIRATION RATE: 16 BRPM | OXYGEN SATURATION: 98 % | WEIGHT: 150 LBS | HEART RATE: 71 BPM

## 2020-06-22 PROCEDURE — 99282 EMERGENCY DEPT VISIT SF MDM: CPT

## 2020-06-22 ASSESSMENT — ENCOUNTER SYMPTOMS
COUGH: 0
SHORTNESS OF BREATH: 0

## 2020-06-22 NOTE — ED PROVIDER NOTES
visit:    Discharge Medication List as of 6/22/2020  4:09 PM          Follow-up:  Katie Dang, APRN - CNP  [address] [facility]  [phone]              Final Impression:   1.  Contact dermatitis, unspecified contact dermatitis type, unspecified trigger               (Please note that portions of this note were completed with a voice recognitionprogram.  Efforts were made to edit the dictations but occasionally words are mis-transcribed.)    Damari Lowery MD (electronically signed)  Attending Emergency Physician            Damari Lowery MD  06/22/20 0024

## 2020-07-13 ENCOUNTER — HOSPITAL ENCOUNTER (INPATIENT)
Age: 25
LOS: 4 days | Discharge: HOME | DRG: 753 | End: 2020-07-17
Attending: PSYCHIATRY & NEUROLOGY | Admitting: PSYCHIATRY & NEUROLOGY
Payer: COMMERCIAL

## 2020-07-13 ENCOUNTER — HOSPITAL ENCOUNTER (EMERGENCY)
Facility: CLINIC | Age: 25
Discharge: HOME | End: 2020-07-13
Attending: EMERGENCY MEDICINE
Payer: COMMERCIAL

## 2020-07-13 ENCOUNTER — HOSPITAL ENCOUNTER (EMERGENCY)
Facility: CLINIC | Age: 25
Discharge: PSYCHIATRIC OR SUBSTANCE USE FACILITY | End: 2020-07-13
Attending: EMERGENCY MEDICINE
Payer: COMMERCIAL

## 2020-07-13 ENCOUNTER — APPOINTMENT (OUTPATIENT)
Dept: CT IMAGING | Facility: CLINIC | Age: 25
End: 2020-07-13
Payer: COMMERCIAL

## 2020-07-13 VITALS
BODY MASS INDEX: 24.99 KG/M2 | TEMPERATURE: 98.3 F | HEIGHT: 65 IN | RESPIRATION RATE: 15 BRPM | WEIGHT: 150 LBS | DIASTOLIC BLOOD PRESSURE: 74 MMHG | OXYGEN SATURATION: 98 % | SYSTOLIC BLOOD PRESSURE: 125 MMHG | HEART RATE: 102 BPM

## 2020-07-13 VITALS
SYSTOLIC BLOOD PRESSURE: 119 MMHG | HEART RATE: 99 BPM | WEIGHT: 149 LBS | DIASTOLIC BLOOD PRESSURE: 68 MMHG | BODY MASS INDEX: 24.79 KG/M2 | RESPIRATION RATE: 14 BRPM | TEMPERATURE: 98.6 F | OXYGEN SATURATION: 99 %

## 2020-07-13 LAB
-: ABNORMAL
ABSOLUTE EOS #: 0 K/UL (ref 0–0.4)
ABSOLUTE IMMATURE GRANULOCYTE: ABNORMAL K/UL (ref 0–0.3)
ABSOLUTE LYMPH #: 0.6 K/UL (ref 1–4.8)
ABSOLUTE MONO #: 0.2 K/UL (ref 0.1–1.2)
ALBUMIN SERPL-MCNC: 4.7 G/DL (ref 3.5–5.2)
ALBUMIN/GLOBULIN RATIO: 1.6 (ref 1–2.5)
ALP BLD-CCNC: 49 U/L (ref 35–104)
ALT SERPL-CCNC: 14 U/L (ref 5–33)
AMORPHOUS: ABNORMAL
AMPHETAMINE SCREEN URINE: NEGATIVE
ANION GAP SERPL CALCULATED.3IONS-SCNC: 12 MMOL/L (ref 9–17)
AST SERPL-CCNC: 37 U/L
BACTERIA: ABNORMAL
BARBITURATE SCREEN URINE: NEGATIVE
BASOPHILS # BLD: 0 % (ref 0–2)
BASOPHILS ABSOLUTE: 0 K/UL (ref 0–0.2)
BENZODIAZEPINE SCREEN, URINE: NEGATIVE
BILIRUB SERPL-MCNC: 0.2 MG/DL (ref 0.3–1.2)
BILIRUBIN URINE: NEGATIVE
BUN BLDV-MCNC: 9 MG/DL (ref 6–20)
BUN/CREAT BLD: ABNORMAL (ref 9–20)
BUPRENORPHINE URINE: NORMAL
CALCIUM SERPL-MCNC: 9.8 MG/DL (ref 8.6–10.4)
CANNABINOID SCREEN URINE: NEGATIVE
CASTS UA: ABNORMAL /LPF (ref 0–2)
CHLORIDE BLD-SCNC: 108 MMOL/L (ref 98–107)
CO2: 21 MMOL/L (ref 20–31)
COCAINE METABOLITE, URINE: NEGATIVE
COLOR: YELLOW
COMMENT UA: ABNORMAL
CREAT SERPL-MCNC: 0.8 MG/DL (ref 0.5–0.9)
CRYSTALS, UA: ABNORMAL /HPF
D-DIMER QUANTITATIVE: 0.57 MG/L FEU
DIFFERENTIAL TYPE: ABNORMAL
EOSINOPHILS RELATIVE PERCENT: 0 % (ref 1–4)
EPITHELIAL CELLS UA: ABNORMAL /HPF (ref 0–5)
GFR AFRICAN AMERICAN: >60 ML/MIN
GFR NON-AFRICAN AMERICAN: >60 ML/MIN
GFR SERPL CREATININE-BSD FRML MDRD: ABNORMAL ML/MIN/{1.73_M2}
GFR SERPL CREATININE-BSD FRML MDRD: ABNORMAL ML/MIN/{1.73_M2}
GLUCOSE BLD-MCNC: 117 MG/DL (ref 70–99)
GLUCOSE URINE: NEGATIVE
HCG QUALITATIVE: NEGATIVE
HCT VFR BLD CALC: 39.5 % (ref 36–46)
HEMOGLOBIN: 12.8 G/DL (ref 12–16)
IMMATURE GRANULOCYTES: ABNORMAL %
KETONES, URINE: NEGATIVE
LACTIC ACID: 3.6 MMOL/L (ref 0.5–2.2)
LEUKOCYTE ESTERASE, URINE: NEGATIVE
LYMPHOCYTES # BLD: 7 % (ref 24–44)
MCH RBC QN AUTO: 29.5 PG (ref 26–34)
MCHC RBC AUTO-ENTMCNC: 32.6 G/DL (ref 31–37)
MCV RBC AUTO: 90.7 FL (ref 80–100)
MDMA URINE: NORMAL
METHADONE SCREEN, URINE: NEGATIVE
METHAMPHETAMINE, URINE: NORMAL
MONOCYTES # BLD: 3 % (ref 2–11)
MUCUS: ABNORMAL
NITRITE, URINE: NEGATIVE
NRBC AUTOMATED: ABNORMAL PER 100 WBC
OPIATES, URINE: NEGATIVE
OTHER OBSERVATIONS UA: ABNORMAL
OXYCODONE SCREEN URINE: NEGATIVE
PDW BLD-RTO: 13.7 % (ref 12.5–15.4)
PH UA: 7 (ref 5–8)
PHENCYCLIDINE, URINE: NEGATIVE
PLATELET # BLD: 389 K/UL (ref 140–450)
PLATELET ESTIMATE: ABNORMAL
PMV BLD AUTO: 7.4 FL (ref 6–12)
POTASSIUM SERPL-SCNC: 3.9 MMOL/L (ref 3.7–5.3)
PROPOXYPHENE, URINE: NORMAL
PROTEIN UA: NEGATIVE
RBC # BLD: 4.35 M/UL (ref 4–5.2)
RBC # BLD: ABNORMAL 10*6/UL
RBC UA: ABNORMAL /HPF (ref 0–2)
RENAL EPITHELIAL, UA: ABNORMAL /HPF
SARS-COV-2, PCR: NORMAL
SARS-COV-2, RAPID: NOT DETECTED
SARS-COV-2: NORMAL
SEG NEUTROPHILS: 90 % (ref 36–66)
SEGMENTED NEUTROPHILS ABSOLUTE COUNT: 7.4 K/UL (ref 1.8–7.7)
SODIUM BLD-SCNC: 141 MMOL/L (ref 135–144)
SOURCE: NORMAL
SPECIFIC GRAVITY UA: 1.01 (ref 1–1.03)
TEST INFORMATION: NORMAL
TOTAL PROTEIN: 7.6 G/DL (ref 6.4–8.3)
TRICHOMONAS: ABNORMAL
TRICYCLIC ANTIDEPRESSANTS, UR: NORMAL
TROPONIN INTERP: NORMAL
TROPONIN T: NORMAL NG/ML
TROPONIN, HIGH SENSITIVITY: <6 NG/L (ref 0–14)
TSH SERPL DL<=0.05 MIU/L-ACNC: 0.99 MIU/L (ref 0.3–5)
TURBIDITY: CLEAR
URINE HGB: ABNORMAL
UROBILINOGEN, URINE: NORMAL
WBC # BLD: 8.2 K/UL (ref 3.5–11)
WBC # BLD: ABNORMAL 10*3/UL
WBC UA: ABNORMAL /HPF (ref 0–5)
YEAST: ABNORMAL

## 2020-07-13 PROCEDURE — 6370000000 HC RX 637 (ALT 250 FOR IP): Performed by: EMERGENCY MEDICINE

## 2020-07-13 PROCEDURE — 93005 ELECTROCARDIOGRAM TRACING: CPT | Performed by: EMERGENCY MEDICINE

## 2020-07-13 PROCEDURE — 81001 URINALYSIS AUTO W/SCOPE: CPT

## 2020-07-13 PROCEDURE — 71260 CT THORAX DX C+: CPT

## 2020-07-13 PROCEDURE — U0002 COVID-19 LAB TEST NON-CDC: HCPCS

## 2020-07-13 PROCEDURE — 99285 EMERGENCY DEPT VISIT HI MDM: CPT

## 2020-07-13 PROCEDURE — 80053 COMPREHEN METABOLIC PANEL: CPT

## 2020-07-13 PROCEDURE — 99284 EMERGENCY DEPT VISIT MOD MDM: CPT

## 2020-07-13 PROCEDURE — 6360000002 HC RX W HCPCS: Performed by: EMERGENCY MEDICINE

## 2020-07-13 PROCEDURE — 6360000004 HC RX CONTRAST MEDICATION: Performed by: EMERGENCY MEDICINE

## 2020-07-13 PROCEDURE — 80307 DRUG TEST PRSMV CHEM ANLYZR: CPT

## 2020-07-13 PROCEDURE — 83605 ASSAY OF LACTIC ACID: CPT

## 2020-07-13 PROCEDURE — 84443 ASSAY THYROID STIM HORMONE: CPT

## 2020-07-13 PROCEDURE — 84484 ASSAY OF TROPONIN QUANT: CPT

## 2020-07-13 PROCEDURE — 96374 THER/PROPH/DIAG INJ IV PUSH: CPT

## 2020-07-13 PROCEDURE — 85025 COMPLETE CBC W/AUTO DIFF WBC: CPT

## 2020-07-13 PROCEDURE — 84703 CHORIONIC GONADOTROPIN ASSAY: CPT

## 2020-07-13 PROCEDURE — 1240000000 HC EMOTIONAL WELLNESS R&B

## 2020-07-13 PROCEDURE — 85379 FIBRIN DEGRADATION QUANT: CPT

## 2020-07-13 PROCEDURE — 36415 COLL VENOUS BLD VENIPUNCTURE: CPT

## 2020-07-13 PROCEDURE — 2580000003 HC RX 258: Performed by: EMERGENCY MEDICINE

## 2020-07-13 RX ORDER — 0.9 % SODIUM CHLORIDE 0.9 %
1000 INTRAVENOUS SOLUTION INTRAVENOUS ONCE
Status: COMPLETED | OUTPATIENT
Start: 2020-07-13 | End: 2020-07-13

## 2020-07-13 RX ORDER — NICOTINE 21 MG/24HR
1 PATCH, TRANSDERMAL 24 HOURS TRANSDERMAL DAILY
Status: DISCONTINUED | OUTPATIENT
Start: 2020-07-14 | End: 2020-07-17 | Stop reason: HOSPADM

## 2020-07-13 RX ORDER — LORAZEPAM 1 MG/1
0.5 TABLET ORAL EVERY 8 HOURS PRN
Qty: 3 TABLET | Refills: 0 | Status: ON HOLD | OUTPATIENT
Start: 2020-07-13 | End: 2020-07-17 | Stop reason: HOSPADM

## 2020-07-13 RX ORDER — SODIUM CHLORIDE 0.9 % (FLUSH) 0.9 %
10 SYRINGE (ML) INJECTION PRN
Status: DISCONTINUED | OUTPATIENT
Start: 2020-07-13 | End: 2020-07-13 | Stop reason: HOSPADM

## 2020-07-13 RX ORDER — 0.9 % SODIUM CHLORIDE 0.9 %
70 INTRAVENOUS SOLUTION INTRAVENOUS ONCE
Status: COMPLETED | OUTPATIENT
Start: 2020-07-13 | End: 2020-07-13

## 2020-07-13 RX ORDER — LORAZEPAM 1 MG/1
1 TABLET ORAL ONCE
Status: COMPLETED | OUTPATIENT
Start: 2020-07-13 | End: 2020-07-13

## 2020-07-13 RX ORDER — BUSPIRONE HYDROCHLORIDE 10 MG/1
7.5 TABLET ORAL 2 TIMES DAILY
Status: ON HOLD | COMMUNITY
End: 2020-07-17 | Stop reason: HOSPADM

## 2020-07-13 RX ORDER — ONDANSETRON 2 MG/ML
4 INJECTION INTRAMUSCULAR; INTRAVENOUS ONCE
Status: COMPLETED | OUTPATIENT
Start: 2020-07-13 | End: 2020-07-13

## 2020-07-13 RX ORDER — TRAZODONE HYDROCHLORIDE 50 MG/1
50 TABLET ORAL NIGHTLY PRN
Status: DISCONTINUED | OUTPATIENT
Start: 2020-07-13 | End: 2020-07-17 | Stop reason: HOSPADM

## 2020-07-13 RX ADMIN — Medication 10 ML: at 14:51

## 2020-07-13 RX ADMIN — LORAZEPAM 1 MG: 1 TABLET ORAL at 09:05

## 2020-07-13 RX ADMIN — IOPAMIDOL 75 ML: 755 INJECTION, SOLUTION INTRAVENOUS at 14:50

## 2020-07-13 RX ADMIN — SODIUM CHLORIDE 1000 ML: 9 INJECTION, SOLUTION INTRAVENOUS at 14:05

## 2020-07-13 RX ADMIN — SODIUM CHLORIDE 70 ML: 9 INJECTION, SOLUTION INTRAVENOUS at 14:51

## 2020-07-13 RX ADMIN — ONDANSETRON 4 MG: 2 INJECTION INTRAMUSCULAR; INTRAVENOUS at 18:31

## 2020-07-13 ASSESSMENT — PAIN SCALES - GENERAL
PAINLEVEL_OUTOF10: 3
PAINLEVEL_OUTOF10: 0
PAINLEVEL_OUTOF10: 0
PAINLEVEL_OUTOF10: 4

## 2020-07-13 ASSESSMENT — PAIN DESCRIPTION - LOCATION
LOCATION: CHEST
LOCATION: ABDOMEN

## 2020-07-13 ASSESSMENT — SLEEP AND FATIGUE QUESTIONNAIRES
SLEEP PATTERN: DIFFICULTY FALLING ASLEEP
DIFFICULTY FALLING ASLEEP: YES
DO YOU HAVE DIFFICULTY SLEEPING: YES
DIFFICULTY STAYING ASLEEP: YES
DIFFICULTY ARISING: NO
RESTFUL SLEEP: NO
AVERAGE NUMBER OF SLEEP HOURS: 6
DO YOU USE A SLEEP AID: YES

## 2020-07-13 ASSESSMENT — PAIN DESCRIPTION - DESCRIPTORS: DESCRIPTORS: SHARP

## 2020-07-13 ASSESSMENT — ENCOUNTER SYMPTOMS
VOMITING: 0
SORE THROAT: 0
NAUSEA: 1
DIARRHEA: 0
SHORTNESS OF BREATH: 0
SHORTNESS OF BREATH: 0
VOMITING: 0

## 2020-07-13 ASSESSMENT — PATIENT HEALTH QUESTIONNAIRE - PHQ9: SUM OF ALL RESPONSES TO PHQ QUESTIONS 1-9: 18

## 2020-07-13 ASSESSMENT — LIFESTYLE VARIABLES: HISTORY_ALCOHOL_USE: NO

## 2020-07-13 ASSESSMENT — PAIN DESCRIPTION - PAIN TYPE
TYPE: CHRONIC PAIN
TYPE: ACUTE PAIN

## 2020-07-13 ASSESSMENT — PAIN DESCRIPTION - ONSET: ONSET: SUDDEN

## 2020-07-13 ASSESSMENT — PAIN DESCRIPTION - FREQUENCY: FREQUENCY: INTERMITTENT

## 2020-07-13 ASSESSMENT — PAIN DESCRIPTION - PROGRESSION: CLINICAL_PROGRESSION: NOT CHANGED

## 2020-07-13 ASSESSMENT — PAIN DESCRIPTION - ORIENTATION: ORIENTATION: LEFT

## 2020-07-13 NOTE — ED PROVIDER NOTES
[facility]  [address] [facility]  Phone: [facility] ED  EMERGENCY DEPARTMENT ENCOUNTER      Pt Name: Adriana Garcia  MRN: [id number]  [place] 1995  Date of evaluation: 7/13/2020  Provider: Roberth Diaz DO    CHIEF COMPLAINT       Chief Complaint   Patient presents with    Drug Overdose     trazadone and alcohol last night , was seen at [facility] at [place] [address]   (Location/Symptom, Timing/Onset,Context/Setting, Quality, Duration, Modifying Factors, Severity)  Note limiting factors. Adriana Garcia is a 27 y.o. female who presents to the emergency department for evaluation of shakiness and still not feeling well. Patient states that last night before bed she took 3 or 4 of her trazodone tablets and mixed it with alcohol to try to help her sleep. She did this because taking 1 has not been enough. Shortly after she went to [facility] where she was seen, evaluated and treated as well as discharged. She went home and went back to bed, still feels shaky. She not having any pain anywhere. She is nauseous. She has not had any vomiting. Patient denies suicidal or homicidal thoughts    Nursing Notes were reviewed. REVIEW OF SYSTEMS    (2-9systems for level 4, 10 or more for level 5)     Review of Systems   Constitutional: Negative for fever. Respiratory: Negative for shortness of breath. Cardiovascular: Negative for chest pain. Gastrointestinal: Positive for nausea. Negative for vomiting. Skin: Negative for rash. Neurological: Positive for tremors. Psychiatric/Behavioral: Negative for suicidal ideas. Except asnoted above the remainder of the review of systems was reviewed and negative.        PAST MEDICAL HISTORY     Past Medical History:   Diagnosis Date    Asthma     Depression     Hashimoto's thyroiditis          SURGICAL HISTORY       Past Surgical History:   Procedure Laterality Date    ED Triage Vitals [07/13/20 0844]   BP Temp Temp Source Pulse Resp SpO2 Height Weight   129/75 98.6 °F (37 °C) Oral 116 15 98 % -- 149 lb (67.6 kg)       Physical Exam  Vitals signs and nursing note reviewed. Constitutional:       General: She is not in acute distress. Appearance: Normal appearance. She is not ill-appearing or toxic-appearing. HENT:      Head: Normocephalic and atraumatic. Nose: Nose normal. No congestion. Mouth/Throat:      Mouth: Mucous membranes are moist.   Eyes:      General:         Right eye: No discharge. Left eye: No discharge. Conjunctiva/sclera: Conjunctivae normal.   Neck:      Musculoskeletal: Normal range of motion. Cardiovascular:      Rate and Rhythm: Normal rate and regular rhythm. Pulses: Normal pulses. Heart sounds: Normal heart sounds. No murmur. Pulmonary:      Effort: Pulmonary effort is normal. No respiratory distress. Breath sounds: Normal breath sounds. No wheezing. Abdominal:      General: Abdomen is flat. There is no distension. Palpations: Abdomen is soft. Tenderness: There is no abdominal tenderness. Musculoskeletal: Normal range of motion. General: No deformity or signs of injury. Skin:     General: Skin is warm and dry. Capillary Refill: Capillary refill takes less than 2 seconds. Findings: No rash. Neurological:      General: No focal deficit present. Mental Status: She is alert and oriented to person, place, and time. Mental status is at baseline. Motor: No weakness. Comments: Speaking normally. No facial asymmetry. Moving all 4 extremities. Normal gait. No visual field deficits. Extraocular movements intact. There is no horizontal or vertical nystagmus. Pt can raise eyebrows, close eyes tight and puff out cheeks. Hearing intact. Uvula midline and no difficulty swallowing. Can rotate head side to side and shrug shoulders.  The patient has a normal finger to nose exam performed at bedside. Gait testing normal   Psychiatric:         Mood and Affect: Mood normal.         EMERGENCY DEPARTMENT COURSE and DIFFERENTIAL DIAGNOSIS/MDM:   Vitals:    Vitals:    07/13/20 0844 07/13/20 0928   BP: 129/75 119/68   Pulse: 116 99   Resp: 15 14   Temp: 98.6 °F (37 °C)    TempSrc: Oral    SpO2: 98% 99%   Weight: 67.6 kg (149 lb)        Patient presents to the emergency department with the complaint described above. Vital signs are normal.  She is nontoxic and resting comfortably. She is not suicidal or homicidal.  I did review the patient's visit from [facility] last night and blood work and alcohol level were done. I am going to get an EKG and I am going to give the patient some Ativan and I will reevaluate. DIAGNOSTIC RESULTS     LABS:  Labs Reviewed - No data to display    All other labs were within normal range or not returned as of this dictation. RADIOLOGY:  No orders to display         ED Course as of Jul 13 0954   Mon Jul 13, 2020   0913 Twelve lead EKG interpreted by myself:  A 12 lead EKG done at 0907, interpreted by myself, showed a regular rhythm at a rate of 97bpm.  The [place] interval was normal.  The QRS complex was slightly abnormal consistent with an incomplete right bundle branch block. There was no ST segment elevation or depression, T wave inversion not present, nonspecific T wave changes noted consistent with right bundle branch block morphology. QRS progression through precordial leads was abnormal.  Interpretation: Normal sinus rhythm noted, incomplete right bundle branch block with associated changes noted.   Slightly prolonged QT, however, none of these are significantly changed when compared with an EKG on September 27, 2019    [TS]   0920 Since the patient stated she may have taken some BuSpar to, I did give Ativan which I do think will help with a little bit of anxiety she is experiencing but will also help to raise her seizure threshold    [TS] 9479 On reevaluation she has improved, she is feeling better. I did talk to her about the importance of taking medication as prescribed and not mixing it with alcohol. She is out of her trazodone and BuSpar, she has an appointment to follow-up with his [facility]. I told her I would write her a prescription for just a few Ativan tablets to have on hand if needed and otherwise she could return to ED if condition worsens or she has any new/concerning symptoms in general    At this time the patient is without objective evidence of an acute process requiring hospitalization or inpatient management. They have remained hemodynamically stable and are stable for discharge with outpatient follow-up. Standard anticipatory guidance given to patient upon discharge. Have given them a specific time frame in which to follow-up and who to follow-up with. I have also advised them that they should return to the emergency department if they get worse, or not getting better or develop any new or concerning symptoms. Patient demonstrates understanding.    [TS]      ED Course User Index  [TS] Franky Shabazz DO         PROCEDURES:  Unless otherwise noted below, none     Procedures    FINAL IMPRESSION      1. Accidental overdose, initial encounter    2. Mild episode of recurrent major depressive disorder [facility])          DISPOSITION/PLAN   DISPOSITION Decision To Discharge 07/13/2020 09:53:22 AM      PATIENT REFERRED TO:  Anyi Aguilar, APRN - CNP  [address] [facility]  [phone]    In 3 days        DISCHARGE MEDICATIONS:  New Prescriptions    LORAZEPAM (ATIVAN) 1 MG TABLET    Take 0.5 tablets by mouth every 8 hours as needed for Anxiety for up to 30 days.           (Please note that portions of this note were completed with a voice recognition program.  Efforts were made to edit the dictations but occasionally words are mis-transcribed.)    Franky Shabazz DO (electronically signed)  Board Certified Emergency Physician          Alo Duarte,   07/13/20 8097

## 2020-07-13 NOTE — ED PROVIDER NOTES
[facility] ED  [address] [facility]  Phone: [phone] [address]  EMERGENCY DEPARTMENT ENCOUNTER      Pt Name: Cliff Hennessy  MRN: [id number]  Franci 1995  Date of evaluation: 7/13/2020  Provider: Ck Reid DO    CHIEF COMPLAINT       Chief Complaint   Patient presents with    Chest Pain     Was seen here this morning for an overdose. Took a bottle of Wellburtin and Trazedone Friday night. Pt denies taking anything since being discharged this morning.  Fatigue    Tremors         HISTORY OF PRESENT ILLNESS   (Location/Symptom, Timing/Onset,Context/Setting, Quality, Duration, Modifying Factors, Severity)  Note limiting factors. Cliff Hennessy is a 27 y.o. female who presents to the emergency department for reevaluation of abnormal behavior. Patient was seen here earlier this morning, she had also been seen at [facility] last night. She reported she had taken extra doses of her trazodone and BuSpar, now she is reporting that maybe she did this Friday night and so there is some concern that it is still in her system causing abnormal behavior. Father is present and states that at home she appeared to be hallucinating, also unable to perform simple motor functions like write her name or walk. She is reporting fatigue and tremors. Nursing Notes were reviewed. REVIEW OF SYSTEMS    (2-9systems for level 4, 10 or more for level 5)     Review of Systems   Constitutional: Positive for fatigue. Negative for fever. HENT: Negative for sore throat. Respiratory: Negative for shortness of breath. Cardiovascular: Negative for chest pain. Gastrointestinal: Negative for diarrhea and vomiting. Genitourinary: Negative for dysuria. Skin: Negative for rash. Neurological: Positive for tremors. Negative for weakness. Psychiatric/Behavioral: Positive for confusion and hallucinations. All other systems reviewed and are negative.       Except asnoted above the remainder of the review of systems was reviewed and negative. PAST MEDICAL HISTORY     Past Medical History:   Diagnosis Date    Asthma     Depression     Hashimoto's thyroiditis          SURGICAL HISTORY       Past Surgical History:   Procedure Laterality Date    APPENDECTOMY           CURRENT MEDICATIONS     Previous Medications    BUPROPION HCL (WELLBUTRIN PO)    Take 150 mg by mouth daily    BUSPIRONE (BUSPAR) 10 MG TABLET    Take 7.5 mg by mouth 2 times daily     LORAZEPAM (ATIVAN) 1 MG TABLET    Take 0.5 tablets by mouth every 8 hours as needed for Anxiety for up to 30 days. NORGESTIMATE-ETH ESTRADIOL (SPRINTEC 28 PO)    Take by mouth    TRAZODONE HCL PO    Take 50 mg by mouth 2 times daily       ALLERGIES     Latex;  Nyquil hbp cold & flu  [dm-doxylamine-acetaminophen]; and Oseltamivir    FAMILY HISTORY       Family History   Problem Relation Age of Onset    Diabetes Paternal Grandmother     Cancer Maternal Grandmother           SOCIAL HISTORY       Social History     Socioeconomic History    Marital status: Single     Spouse name: None    Number of children: None    Years of education: None    Highest education level: None   Occupational History    None   Social Needs    Financial resource strain: None    Food insecurity     Worry: None     Inability: None    Transportation needs     Medical: None     Non-medical: None   Tobacco Use    Smoking status: Former Smoker    Smokeless tobacco: Never Used    Tobacco comment: was only an occas smoker   Substance and Sexual Activity    Alcohol use: Not Currently     Alcohol/week: 5.0 standard drinks     Types: 5 Cans of beer per week     Comment: every other week    Drug use: No    Sexual activity: Yes     Partners: Male   Lifestyle    Physical activity     Days per week: None     Minutes per session: None    Stress: None   Relationships    Social connections     Talks on phone: None     Gets together: None     Attends branch block. There was no ST segment elevation or depression, T wave inversion not present, some nonspecific flattening noted. QRS progression through precordial leads was slightly abnormal consistent with a right bundle branch block. Interpretation: Sinus tachycardia with PAC, incomplete right bundle branch block morphology with some nonspecific T wave changes, no significant change when compared with previous EKG    LABS:  Labs Reviewed   CBC WITH AUTO DIFFERENTIAL - Abnormal; Notable for the following components:       Result Value    Seg Neutrophils 90 (*)     Lymphocytes 7 (*)     Eosinophils % 0 (*)     Absolute Lymph # 0.60 (*)     All other components within normal limits   COMPREHENSIVE METABOLIC PANEL - Abnormal; Notable for the following components:    Glucose 117 (*)     Chloride 108 (*)     AST 37 (*)     Total Bilirubin 0.20 (*)     All other components within normal limits   LACTIC ACID - Abnormal; Notable for the following components:    Lactic Acid 3.6 (*)     All other components within normal limits   URINE RT REFLEX TO CULTURE - Abnormal; Notable for the following components:    Urine Hgb TRACE (*)     All other components within normal limits   MICROSCOPIC URINALYSIS - Abnormal; Notable for the following components:    Other Observations UA   (*)     Value: Utilizing a urinalysis as the only screening method to exclude a potential uropathogen can be unreliable in many patient populations. Rapid screening tests are less sensitive than culture and if UTI is a clinical possibility, culture should be considered despite a negative urinalysis. All other components within normal limits   HCG, SERUM, QUALITATIVE   URINE DRUG SCREEN   TROPONIN   D-DIMER, QUANTITATIVE   COVID-19   TSH WITHOUT REFLEX       All other labs were within normal range or not returned as of this dictation.     RADIOLOGY:  CT CHEST PULMONARY EMBOLISM W CONTRAST   Final Result   No pulmonary embolism or acute pulmonary abnormality. ED Course as of Jul 13 1642   Mon Jul 13, 2020   1554 Currently after the mother came to get some more information there was some concern from the past that she has been on Wellbutrin and did not tolerate it well and now the patient recently started Wellbutrin a week ago    [TS]   1555 At this time I do think this patient would benefit from psychiatric evaluation and we are contacting [facility]    [TS]   1616 I did contact behavioral health at [facility] as I do think this patient would benefit from psychiatric admission. [TS]   46 Spoke with Yazan Bland regarding patient and she is excepting on behalf of Dr. Sunil Cuevas under the diagnosis of acute psychosis    [TS]   [phone] I spoke with mom and pt and updated regarding results and plan    [TS]      ED Course User Index  [TS] Rody Teague DO         PROCEDURES:  Unless otherwise noted below, none     Procedures    FINAL IMPRESSION      1. Acute psychosis [facility])          DISPOSITION/PLAN   DISPOSITION Decision To Transfer 07/13/2020 03:56:00 PM      PATIENT REFERRED TO:  No follow-up provider specified.     DISCHARGE MEDICATIONS:  New Prescriptions    No medications on file          (Please note that portions of this note were completed with a voice recognition program.  Efforts were made to edit the dictations but occasionally words are mis-transcribed.)    Rody Teague DO (electronically signed)  Board Certified Emergency Physician          Rody Teague DO  07/13/20 2356

## 2020-07-13 NOTE — ED NOTES
[facility] access called - forms sent to [facility] for voluntary admission      Luna Mata RN  07/13/20 3839

## 2020-07-13 NOTE — LETTER
[facility] ED  [address] [facility]  Phone: [phone]               July 13, 2020    Patient: Estefania Argueta   YOB: 1995   Date of Visit: 7/13/2020 DC time 1007 am       To Whom It May Concern:    Estefania Argueta was seen and treated in our emergency department on 7/13/2020. Clover Clemons was with Ms. Corey Maki during ER visit. Sincerely,       ANNMARIE Gallardo RN BSN         Signature:__________________________________

## 2020-07-13 NOTE — ED NOTES
Mother at bedside, she states daughter had reaction to Wellbutrin when she was 17 with hallucinations.  Mother and pt states she just started Wellbutrin 1 week ago and drank alcohol this weekend      Rocio Niño RN  07/13/20 8438

## 2020-07-13 NOTE — ED NOTES
Pt states \" what causes hallucinations? I am seeing spider webs and ants all over! \" ( pt swats at the ceiling) Writer asked if pt has taken any substance- inhaled or ingestion . Pt declines. Pupils dilated 4mm, pt c/o bilateral hand tremors. Pt states she took Wellbutrin and 50 count of Tramadol Friday night , pt states she drank Sunday night - Saki at dinner then went to [facility] ER.      Artem Salamanca RN  07/13/20 0840

## 2020-07-14 PROBLEM — F32.A DEPRESSION WITH SUICIDAL IDEATION: Status: ACTIVE | Noted: 2020-07-14

## 2020-07-14 PROBLEM — R45.851 DEPRESSION WITH SUICIDAL IDEATION: Status: ACTIVE | Noted: 2020-07-14

## 2020-07-14 LAB
EKG ATRIAL RATE: 121 BPM
EKG ATRIAL RATE: 97 BPM
EKG P AXIS: 69 DEGREES
EKG P AXIS: 74 DEGREES
EKG P-R INTERVAL: 148 MS
EKG P-R INTERVAL: 150 MS
EKG Q-T INTERVAL: 326 MS
EKG Q-T INTERVAL: 386 MS
EKG QRS DURATION: 100 MS
EKG QRS DURATION: 104 MS
EKG QTC CALCULATION (BAZETT): 462 MS
EKG QTC CALCULATION (BAZETT): 490 MS
EKG R AXIS: 25 DEGREES
EKG R AXIS: 71 DEGREES
EKG T AXIS: 23 DEGREES
EKG T AXIS: 47 DEGREES
EKG VENTRICULAR RATE: 121 BPM
EKG VENTRICULAR RATE: 97 BPM

## 2020-07-14 PROCEDURE — 1240000000 HC EMOTIONAL WELLNESS R&B

## 2020-07-14 PROCEDURE — 99223 1ST HOSP IP/OBS HIGH 75: CPT | Performed by: PSYCHIATRY & NEUROLOGY

## 2020-07-14 PROCEDURE — 6370000000 HC RX 637 (ALT 250 FOR IP): Performed by: PSYCHIATRY & NEUROLOGY

## 2020-07-14 RX ORDER — HALOPERIDOL 10 MG/1
5 TABLET ORAL EVERY 4 HOURS PRN
Status: DISCONTINUED | OUTPATIENT
Start: 2020-07-14 | End: 2020-07-17 | Stop reason: HOSPADM

## 2020-07-14 RX ORDER — FLUOXETINE 10 MG/1
10 CAPSULE ORAL DAILY
Status: DISCONTINUED | OUTPATIENT
Start: 2020-07-14 | End: 2020-07-15

## 2020-07-14 RX ORDER — HYDROXYZINE 50 MG/1
50 TABLET, FILM COATED ORAL 3 TIMES DAILY PRN
Status: DISCONTINUED | OUTPATIENT
Start: 2020-07-14 | End: 2020-07-17 | Stop reason: HOSPADM

## 2020-07-14 RX ORDER — HALOPERIDOL 5 MG/ML
5 INJECTION INTRAMUSCULAR EVERY 4 HOURS PRN
Status: DISCONTINUED | OUTPATIENT
Start: 2020-07-14 | End: 2020-07-17 | Stop reason: HOSPADM

## 2020-07-14 RX ORDER — NORGESTIMATE AND ETHINYL ESTRADIOL 0.25-0.035
1 KIT ORAL DAILY
Status: DISCONTINUED | OUTPATIENT
Start: 2020-07-14 | End: 2020-07-14 | Stop reason: RX

## 2020-07-14 RX ADMIN — FLUOXETINE 10 MG: 10 CAPSULE ORAL at 14:35

## 2020-07-14 ASSESSMENT — SLEEP AND FATIGUE QUESTIONNAIRES
DO YOU USE A SLEEP AID: NO
SLEEP PATTERN: NORMAL
DIFFICULTY STAYING ASLEEP: NO
DIFFICULTY ARISING: NO
DO YOU HAVE DIFFICULTY SLEEPING: NO
RESTFUL SLEEP: YES
DIFFICULTY FALLING ASLEEP: NO
AVERAGE NUMBER OF SLEEP HOURS: 6

## 2020-07-14 ASSESSMENT — LIFESTYLE VARIABLES: HISTORY_ALCOHOL_USE: YES

## 2020-07-14 NOTE — GROUP NOTE
Group Therapy Note    Date: 7/14/2020    Group Start Time: 1100  Group End Time: 1130  Group Topic: Psychoeducation    [facility]    Beatris Dash        Group Therapy Note    Attendees: 13/21         Patient's Goal:  To demonstrate the importance of peer supports and improve social skills     Notes:  Patient was pleasant and appropriate throughout the session     Status After Intervention:  Improved    Participation Level:  Active Listener and Interactive    Participation Quality: Appropriate, Attentive, Sharing and Supportive      Speech:  normal      Thought Process/Content: Logical      Affective Functioning: Congruent      Mood: euthymic      Level of consciousness:  Alert, Oriented x4 and Attentive      Response to Learning: Able to verbalize current knowledge/experience, Able to verbalize/acknowledge new learning, Capable of insight and Progressing to goal      Endings: None Reported    Modes of Intervention: Education, Support, Socialization, Exploration, Clarifying, Problem-solving and Activity      Discipline Responsible: Psychoeducational Specialist      Signature:  Beatris Dash

## 2020-07-14 NOTE — GROUP NOTE
Group Therapy Note    Date: 7/14/2020    Group Start Time: 1330  Group End Time: 5112  Group Topic: Cognitive Skills    [facility]    JANKI Thomas        Group Therapy Note    Attendees: 11         Patient's Goal:  To improve interpersonal relationships     Notes:   Pt was pleasant and cooperative     Status After Intervention:  Improved    Participation Level:  Active Listener and Interactive    Participation Quality: Appropriate and Attentive      Speech:  normal      Thought Process/Content: Logical      Affective Functioning: Congruent      Mood: euthymic      Level of consciousness:  Alert and Oriented x4      Response to Learning: Able to verbalize current knowledge/experience and Progressing to goal      Endings: None Reported    Modes of Intervention: Education, Support and Problem-solving      Discipline Responsible: Psychoeducational Specialist      Signature:  Vera Puri

## 2020-07-14 NOTE — PLAN OF CARE
Problem: Altered Mood, Depressive Behavior:  Goal: Able to verbalize and/or display a decrease in depressive symptoms  Description: Able to verbalize and/or display a decrease in depressive symptoms  7/14/2020 1544 by Manjit Ochoa LPN  Outcome: Ongoing   Patient denies all upon 1:1 time with writer. Patient does agree to seek out the staff if the feeling of depression return and increase. Staff will continue to show support and 15 minute safety rounds will be continued. Problem: Altered Mood, Psychotic Behavior:  Goal: Able to verbalize decrease in frequency and intensity of hallucinations  Description: Able to verbalize decrease in frequency and intensity of hallucinations  7/14/2020 1544 by Manjit Ochoa LPN  Outcome: Ongoing    Patient denies all upon 1:1 time with writer. Patient does agree to seek out the staff if the hallucinations return or increase. Staff will continue to show support and 15 minute safety rounds will be continued.

## 2020-07-14 NOTE — GROUP NOTE
Group Therapy Note    Date: 7/14/2020    Group Start Time: 0900  Group End Time: 2671  Group Topic: Community Meeting    [facility] Edrick Denver, CTRS        Group Therapy Note    Attendees: 12         Patient's Goal:  To improve goal setting skills     Notes:   Pt was pleasant and cooperative     Status After Intervention:  Improved    Participation Level:  Active Listener and Interactive    Participation Quality: Appropriate and Attentive      Speech:  normal      Thought Process/Content: Logical      Affective Functioning: Congruent      Mood: euthymic      Level of consciousness:  Alert and Oriented x4      Response to Learning: Able to verbalize current knowledge/experience and Progressing to goal      Endings: None Reported    Modes of Intervention: Education and Support      Discipline Responsible: Psychoeducational Specialist      Signature:  Merced Orourke
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
## 2020-07-14 NOTE — BH NOTE
- Writer calls and speaks with client's father, Rosalio Egan on this date (LUKE in chart). - Mr. May Valente states the following: Tenriism TED BETH has struggled with depression for a long time. He states she is very stubborn, has poor relationship skills, multiple issues with boyfriends and when they fight or break-up she will start drinking and becomes very argumentative. 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 \"  - He states client lives with his parents and is very safe there. - His biggest concern is she might not want to be on any other medications and he is not sure if she can \"manage without medications. \"
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
## 2020-07-14 NOTE — PLAN OF CARE
[facility]  Initial Interdisciplinary Treatment Plan NO      Original treatment plan Date & Time: 7/14/2020 0902    Admission Type:  Admission Type: Voluntary    Reason for admission:   Reason for Admission: Complained of trazodone OD claims visual hallucinations of spiders crawling on her. Estimated Length of Stay:  5-7days  Estimated Discharge Date: to be determined by physician    PATIENT STRENGTHS:  Patient Strengths:Strengths: Communication, Positive Support, Social Skills  Patient Strengths and Limitations:Limitations: Tendency to isolate self  Addictive Behavior: Addictive Behavior  In the past 3 months, have you felt or has someone told you that you have a problem with:  : None  Do you have a history of Chemical Use?: No  Do you have a history of Alcohol Use?: No  Do you have a history of Street Drug Abuse?: No  Histroy of Prescripton Drug Abuse?: No  Medical Problems:  Past Medical History:   Diagnosis Date    Asthma     Depression     Hashimoto's thyroiditis      Status EXAM:Status and Exam  Normal: No  Facial Expression: Avoids Gaze, Worried, Sad  Affect: Appropriate  Level of Consciousness: Alert  Mood:Normal: No  Mood: Anxious, Depressed  Motor Activity:Normal: Yes  Interview Behavior: Cooperative  Preception: [place] to Person, [place] to Time, [place] to Place, [place] to Situation  Attention:Normal: Yes  Thought Processes: Circumstantial  Thought Content:Normal: No  Thought Content: Paranoia  Hallucinations:  Auditory (Comment)(seeing spiders crawling)  Delusions: No  Memory:Normal: Yes  Insight and Judgment: No  Insight and Judgment: Poor Insight, Poor Judgment  Present Suicidal Ideation: No  Present Homicidal Ideation: No    EDUCATION:   Learner Progress Toward Treatment Goals: reviewed group plans and strategies for care    Method:group therapy, medication compliance, individualized assessments and care planning    Outcome: needs reinforcement    PATIENT GOALS: to be discussed with patient within 72 hours    PLAN/TREATMENT RECOMMENDATIONS:     continue group therapy , medications compliance, goal setting, individualized assessments and care, continue to monitor pt on unit      SHORT-TERM GOALS:   Time frame for Short-Term Goals: 5-7 days    LONG-TERM GOALS:  Time frame for Long-Term Goals: 6 months  Members Present in Team Meeting: See [address]
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
## 2020-07-15 PROCEDURE — 99232 SBSQ HOSP IP/OBS MODERATE 35: CPT | Performed by: PSYCHIATRY & NEUROLOGY

## 2020-07-15 PROCEDURE — 6370000000 HC RX 637 (ALT 250 FOR IP): Performed by: PSYCHIATRY & NEUROLOGY

## 2020-07-15 PROCEDURE — 1240000000 HC EMOTIONAL WELLNESS R&B

## 2020-07-15 PROCEDURE — 99222 1ST HOSP IP/OBS MODERATE 55: CPT | Performed by: INTERNAL MEDICINE

## 2020-07-15 RX ORDER — FLUOXETINE HYDROCHLORIDE 20 MG/1
20 CAPSULE ORAL DAILY
Status: DISCONTINUED | OUTPATIENT
Start: 2020-07-16 | End: 2020-07-17 | Stop reason: HOSPADM

## 2020-07-15 RX ADMIN — FLUOXETINE 10 MG: 10 CAPSULE ORAL at 09:03

## 2020-07-15 RX ADMIN — MAGNESIUM HYDROXIDE 30 ML: 400 SUSPENSION ORAL at 13:34

## 2020-07-15 NOTE — PLAN OF CARE
[facility]  Day 3 Interdisciplinary Treatment Plan NOTE    Review Date & Time: 7/15/2020                           1330    Admission Type:   Admission Type: Involuntary    Reason for admission:  Reason for Admission: SI no plan  Estimated Length of Stay: 5-7 days  Estimated Discharge Date Update: to be determined by physician    PATIENT STRENGTHS:  Patient Strengths Strengths: Positive Support, No significant Physical Illness  Patient Strengths and Limitations:Limitations: Tendency to isolate self, Lacks leisure interests, Difficulty problem solving/relies on others to help solve problems, Hopeless about future, Multiple barriers to leisure interests, Inappropriate/potentially harmful leisure interests (depression substance abuse anxiety poor coping skills)  Addictive Behavior:Addictive Behavior  In the past 3 months, have you felt or has someone told you that you have a problem with:  : None  Do you have a history of Chemical Use?: No  Do you have a history of Alcohol Use?: No  Do you have a history of Street Drug Abuse?: Yes  Histroy of Prescripton Drug Abuse?: No  Medical Problems:No past medical history on file.     Risk:  Fall RiskTotal: 53  Kishore Scale Kishore Scale Score: 22  BVC Total: 0  Change in scores no Changes to plan of Care no    Status EXAM:   Status and Exam  Normal: No  Facial Expression: Elevated  Affect: Inappropriate  Level of Consciousness: Alert  Mood:Normal: No  Mood: Depressed, Anxious  Motor Activity:Normal: No  Motor Activity: Decreased  Interview Behavior: Cooperative  Preception: [place] to Person, [place] to Time, [place] to Place, [place] to Situation  Attention:Normal: Yes  Attention: Distractible  Thought Processes: Circumstantial  Thought Content:Normal: Yes  Thought Content: Preoccupations  Hallucinations: None  Delusions: No  Memory:Normal: Yes  Memory: Poor Recent, Confabulation  Insight and Judgment: No  Insight and Judgment: Unmotivated  Present Suicidal Ideation: No  Present Homicidal Ideation: No    Daily Assessment Last Entry:   Daily Sleep (WDL): Within Defined Limits         Patient Currently in Pain: No  Daily Nutrition (WDL): Within Defined Limits    Patient Monitoring:  Frequency of Checks: 4 times per hour, close    Psychiatric Symptoms:   Depression Symptoms  Depression Symptoms: Isolative, Loss of interest  Anxiety Symptoms  Anxiety Symptoms: Generalized  Deb Symptoms  Deb Symptoms: No problems reported or observed. Psychosis Symptoms  Delusion Type: No problems reported or observed.     Suicide Risk CSSR-S:  Have you wished you were dead or wished you could go to sleep and not wake up? : NO  Have you actually had any thoughts of killing yourself? : NO  Have you ever done anything, started to do anything, or prepared to do anything to end your life?: NO  Change in Result                       no                     Change in Plan of care                 no      EDUCATION:   EDUCATION:   Learner Progress Toward Treatment Goals: Reviewed results and recommendations of this team, Reviewed group plan and strategies, Reviewed signs, symptoms and risk of self harm and violent behavior, Reviewed goals and plan of care    Method:small group, individual verbal education    Outcome:verbalized by patient, but needs reinforcement to obtain goals    PATIENT GOALS:  Short term: \"continue to regulate meds,start therapy at discharge\"  Long term: \"stick with therapy stay on meds, stay away from alcohol\"    PLAN/TREATMENT RECOMMENDATIONS UPDATE: continue with group therapies, increased socialization, continue planning for after discharge goals, continue with medication compliance    SHORT-TERM GOALS UPDATE:   Time frame for Short-Term Goals: 5-7 days    LONG-TERM GOALS UPDATE:   Time frame for Long-Term Goals: 6 months  Members Present in Team Meeting: See Signature Sheet    Kalani Robles

## 2020-07-15 NOTE — H&P
HISTORY and [facility]       NAME:  Andrew Molina  MRN: [id number]   YOB: 1995   Date: 7/15/2020   Age: 27 y.o. Gender: female     COMPLAINT AND PRESENT HISTORY:    Andrew Molina is a 27 y.o.  female, admitted because of increasing depression with suicidal ideation. According to ED/ Admission notes, patient reportedly came in with overdose of Wellbutrin it was reported that she took a bottle or his patient states she took a handful and states that she does not remember anything she did voice that she had been drinking beer and sake to that extreme and after words stating that she started seeing things differently and also had some visual hallucinations of spiders on her. Patient denies any suicidal ideation she states that she believes she has had drug-induced psychosis. Patient states that she has not been on any psychiatric medications since her younger years due to side effects that she was getting. States that she was on Wellbutrin in the past.  Patient denies any alcohol or substance abuse but states that she did experience a one night of drinking in excess and that has brought her to this point today. Patient states that living arrangements after discharge will be to return home living with her grandfather. Does admit to keeping a job working 6 to 7 days a week. Patient states that she does plan to go to AA to accustom herself to not drinking as she states that she does have a history family history of alcohol abuse in her mother and father. Patient denies any somatic complaints. No significant lab values or procedures. No  chest pain or  shortness of breath. No fever/chills. Please see patient's psychiatric hx for more information.     DIAGNOSTIC RESULTS   Labs:  CBC:   Recent Labs     07/13/20  1414   WBC 8.2   HGB 12.8        BMP:    Recent Labs     07/13/20  1414      K 3.9   *   CO2 21   BUN 9   CREATININE 0.80 GLUCOSE 117*     Hepatic:   Recent Labs     07/13/20  1414   AST 37*   ALT 14   BILITOT 0.20*   ALKPHOS 49     Lipids: No results for input(s): CHOL, HDL in the last 72 hours.     Invalid input(s): LDLCALCU  U/A:  Lab Results   Component Value Date    COLORU YELLOW 07/13/2020    WBCUA 0 TO 2 07/13/2020    RBCUA None 07/13/2020    MUCUS NOT REPORTED 07/13/2020    BACTERIA None 07/13/2020    SPECGRAV 1.015 07/13/2020    LEUKOCYTESUR NEGATIVE 07/13/2020    GLUCOSEU NEGATIVE 07/13/2020    AMORPHOUS NOT REPORTED 07/13/2020       PAST MEDICAL HISTORY     Past Medical History:   Diagnosis Date    Asthma     Depression     Hashimoto's thyroiditis      Pt denies any history of Diabetes mellitus type 2, hypertension, stroke, heart disease, COPD,  GERD, HLD, Cancer, Seizures,  Kidney Disease, Hepatitis, TB.    SURGICAL HISTORY       Past Surgical History:   Procedure Laterality Date    APPENDECTOMY         FAMILY HISTORY       Family History   Problem Relation Age of Onset    Diabetes Paternal Grandmother     Cancer Maternal Grandmother        SOCIAL HISTORY       Social History     Socioeconomic History    Marital status: Single     Spouse name: Not on file    Number of children: Not on file    Years of education: Not on file    Highest education level: Not on file   Occupational History    Not on file   Social Needs    Financial resource strain: Not on file    Food insecurity     Worry: Not on file     Inability: Not on file    Transportation needs     Medical: Not on file     Non-medical: Not on file   Tobacco Use    Smoking status: Former Smoker    Smokeless tobacco: Never Used    Tobacco comment: was only an occas smoker   Substance and Sexual Activity    Alcohol use: Not Currently     Alcohol/week: 5.0 standard drinks     Types: 5 Cans of beer per week     Comment: every other week    Drug use: No    Sexual activity: Yes     Partners: Male   Lifestyle    Physical activity     Days per week: Not on Locomotor:  No bone or joint pains. No swelling or deformities. Neuropsychiatric:  See HPI. GENERAL PHYSICAL EXAM:     Vitals: /72   Pulse 88   Temp 98.3 °F (36.8 °C) (Oral)   Resp 14   Ht 5' 5\" (1.651 m)   Wt 155 lb (70.3 kg)   SpO2 99%   BMI 25.79 kg/m²  Body mass index is 25.79 kg/m². Pt was examined with a nurse present in the room. GENERAL APPEARANCE:  Fredy Mensah is 27 y.o.,  female, not obese, nourished, conscious, alert. Does not appear to be distress or pain at this time. SKIN:  Warm, dry, no cyanosis or jaundice. HEAD:  Normocephalic, atraumatic, no swelling or tenderness. EYES:  Pupils equal, reactive to light, Conjunctiva is clear, EOMs intact carlos. eyelids WNL. EARS:  No discharge, no marked hearing loss. NOSE:  No rhinorrhea, epistaxis or septal deformity. THROAT:  Not congested. No ulceration bleeding or discharge. NECK:  No stiffness, trachea central.  No palpable masses or L.N.      CHEST:  Symmetrical and equal on expansion. HEART:  Regular rate and rhythm. S1 > S2, No audible murmurs or gallops. LUNGS:  Equal on expansion, normal breath sounds. No adventitious sounds. ABDOMEN:  Soft on palpation. No localized tenderness. No guarding or rigidity. No palpable organomegaly. LYMPHATICS:  No palpable cervical Lymphadenopathy. LOCOMOTOR, BACK AND SPINE:  No tenderness or deformities. EXTREMITIES:  Symmetrical, no pretibial edema. Gracielas sign negative. No discoloration or ulcerations. NEUROLOGIC:  The patient is conscious, alert, oriented,Cranial nerve II-XII intact, taste and smell were not examined. No apparent focal sensory or motor deficits. Muscle strength equal Carlos. No facial droop, tongue protrudes centrally, no slurring of the speech.             PROVISIONAL DIAGNOSES:      Active Problems:    MDD (major depressive disorder), recurrent severe, without psychosis ([facility] [address]    Depression with suicidal ideation    Alcohol use disorder, mild, abuse  Resolved Problems:    * No resolved hospital problems.  *      RADHA ESPOSITO, APRN - CNP on 7/15/2020 at 11:02 AM

## 2020-07-15 NOTE — GROUP NOTE
Group Therapy Note    Date: 7/15/2020    Group Start Time: 0900  Group End Time: 7046  Group Topic: Community Meeting    [address] [facility] [address] [place], [place]        Group Therapy Note    Attendees: 12/22            Patient's Goal:  To increase social interaction and to explore and identify short term goals r/t wellness and recovery. RT discussed group schedule and unit structure/resources and encouraged pts to be engaged in their treatment. Pts were given opportunities to ask questions. Notes: Pt participated in group task and was able to identify short term goals r/t wellness and recovery. Pt is pleasant and supportive of peers        Status After Intervention:  Improved     Participation Level:  Active Listener and Interactive     Participation Quality: Appropriate, Attentive, Sharing         Speech:   Normal     Thought Process/Content: Logical,linear     Affective Functioning: Congruent   Mood: euthymic        Level of consciousness:  Alert, and Attentive        Response to Learning: Able to verbalize current knowledge/experience, Able to verbalize/acknowledge new learning, and Progressing to goal        Endings: None Reported     Modes of Intervention: Education, Support, Socialization, Exploration, Clarifying and Problem-solving        Discipline Responsible: Psychoeducational Specialist        Signature:  Santy Garcia

## 2020-07-15 NOTE — FLOWSHEET NOTE
*Patient participated in the [facility]       07/15/20 1414   Encounter Summary   Services provided to: Patient   Referral/Consult From: Rounding   Continue Visiting   (7/15/20)   Complexity of Encounter Moderate   Length of Encounter 30 minutes   Spiritual Assessment Completed Yes   Spiritual/Adventism   Type Spiritual support   Assessment Calm; Approachable   Intervention Active listening;Prayer   Outcome Receptive

## 2020-07-15 NOTE — GROUP NOTE
Group Therapy Note    Date: 7/14/2020    Group Start Time: 2100  Group End Time: 2115  Group Topic: Relaxation    [facility]    Adonis Pickett RN        Group Therapy Note    Attendees: 12         Status After Intervention:  Unchanged    Participation Level:  Active Listener    Participation Quality: Appropriate      Speech:  normal      Thought Process/Content: Logical      Affective Functioning: Congruent      Mood: anxious      Level of consciousness:  Alert and Oriented x4      Response to Learning: Progressing to goal      Endings: None Reported    Modes of Intervention: Socialization      Discipline Responsible: Registered Nurse      Signature:  Britney Duran RN

## 2020-07-15 NOTE — PROGRESS NOTES
Pharmacy Med Education Group Note    Date: 07/15/20  Start Time: 1430  End Time: 1520    Number Participants in Group:  13    Goal:  Patient will demonstrate an understanding of the medications intended purpose and possible adverse effects  Topic: [place] for Pharmacy Med Ed Group    Discipline Responsible:     OT  AT  [facility].  RT     X Other       Participation Level:     None  Minimal      X Active Listener    X Interactive    Monopolizing         Participation Quality:    X Appropriate  Inappropriate     X       Attentive        Intrusive          Sharing        Resistant          Supportive        Lethargic       Affective:     X Congruent  Incongruent  Blunted  Flat    Constricted  Anxious  Elated  Angry    Labile  Depressed  Other         Cognitive:    X Alert  Oriented PPTP     Concentration   X G  F  P   Attention Span   X G  F  P   Short-Term Memory   X G  F  P   Long-Term Memory  G  F  P   ProblemSolving/  Decision Making  G  F  P   Ability to Process  Information   X G  F  P      Contributing Factors             Delusional             Hallucinating             Flight of Ideas             Other:       Modes of Intervention:    X Education   X Support  Exploration    Clarifying  Problem Solving  Confrontation    Socialization  Limit Setting  Reality Testing    Activity  Movement  Media    Other:            Response to Learning:    X Able to verbalize current knowledge/experience    Able to verbalize/acknowledge new learning    Able to retain information    Capable of insight    Able to change behavior    Progressing to goal    Other:        Comments:     Abby Koenig PharmD, BCPS  7/15/2020 5:12 PM

## 2020-07-15 NOTE — GROUP NOTE
Group Therapy Note    Date: 7/15/2020    Group Start Time: 1100  Group End Time: 9248  Group Topic: Cognitive Skills    JANKI Kim        Group Therapy Note    Attendees: 15/22         Patient's Goal:  To increase social interaction and to practice problem solving and communication skills. Notes: Pt participated fully in group task . Pt was able to practice problem solving and communication skills. Status After Intervention:  Improved    Participation Level:  Active Listener and Interactive    Participation Quality: Appropriate, Attentive, Sharing and Supportive      Speech:  normal      Thought Process/Content: Logical  Linear      Affective Functioning: Congruent      Mood: euthymic      Level of consciousness:  Alert, Oriented x4 and Attentive      Response to Learning: Able to verbalize current knowledge/experience, Able to verbalize/acknowledge new learning, Able to retain information and Progressing to goal      Endings: None Reported    Modes of Intervention: Education, Support, Socialization, Exploration, Clarifying and Problem-solving      Discipline Responsible: Psychoeducational Specialist      Signature:  Ronnie Cousins

## 2020-07-15 NOTE — PLAN OF CARE
Problem: Altered Mood, Depressive Behavior:  Goal: Able to verbalize and/or display a decrease in depressive symptoms  Description: Able to verbalize and/or display a decrease in depressive symptoms  Pt denies thoughts of self harm and is agreeable to seeking out should thoughts of self harm arise. Safe environment maintained. Q15 minute checks for safety cont per unit policy. Will cont to monitor for safety and provides support and reassurance as needed. 7/15/2020 0945 by Gustavo Alvarez RN  Outcome: Ongoing  7/15/2020 0943 by Gustavo Alvarez RN  Outcome: Ongoing  7/15/2020 0937 by Gustavo Alvarez RN  Outcome: Ongoing  7/14/2020 2021 by Gem Hill RN  Outcome: Ongoing     Problem: Altered Mood, Psychotic Behavior:  Goal: Able to verbalize decrease in frequency and intensity of hallucinations  Description: Able to verbalize decrease in frequency and intensity of hallucinations  Patient denies auditory halluccinations. Patient is oriented X 4, taking medications and attending groups.   7/15/2020 0945 by Gustavo Alvarez RN  Outcome: Ongoing  7/15/2020 0943 by Gustavo Alvarez RN  Outcome: Ongoing  7/15/2020 0937 by Gustavo Alvarez RN  Outcome: Ongoing  7/14/2020 2021 by Gem Hill RN  Outcome: Ongoing

## 2020-07-15 NOTE — PROGRESS NOTES
BEHAVIORAL HEALTH FOLLOW-UP NOTE     7/15/2020     Patient was seen and examined in person, Chart reviewed   Patient's case discussed with staff/team    Chief Complaint: Suicide attempt by overdose    Interim History:   -Patient states she is in good mood today. She is feeling better with the medication  -No problems with Prozac.   -   -Has been speaking with dad and boyfriend. A conversation with her boyfriend has gone well.   There are no plans for an imminent break-up    Appetite:  [x] Normal/Unchanged  [] Increased  [] Decreased      Sleep:       [x] Normal/Unchanged  [] Fair       [] Poor              Energy:    [] Normal/Unchanged  [] Increased  [x] Decreased        Aggression:  [] yes  [x] no    Patient is [x] able  [] unable to CONTRACT FOR SAFETY ON THE UNIT    PAST MEDICAL/PSYCHIATRIC HISTORY:   Past Medical History:   Diagnosis Date    Asthma     Depression     Hashimoto's thyroiditis        FAMILY/SOCIAL HISTORY:  Family History   Problem Relation Age of Onset    Diabetes Paternal Grandmother     Cancer Maternal Grandmother      Social History     Socioeconomic History    Marital status: Single     Spouse name: Not on file    Number of children: Not on file    Years of education: Not on file    Highest education level: Not on file   Occupational History    Not on file   Social Needs    Financial resource strain: Not on file    Food insecurity     Worry: Not on file     Inability: Not on file    Transportation needs     Medical: Not on file     Non-medical: Not on file   Tobacco Use    Smoking status: Former Smoker    Smokeless tobacco: Never Used    Tobacco comment: was only an occas smoker   Substance and Sexual Activity    Alcohol use: Not Currently     Alcohol/week: 5.0 standard drinks     Types: 5 Cans of beer per week     Comment: every other week    Drug use: No    Sexual activity: Yes     Partners: Male   Lifestyle    Physical activity     Days per week: Not on file Minutes per session: Not on file    Stress: Not on file   Relationships    Social connections     Talks on phone: Not on file     Gets together: Not on file     Attends Synagogue service: Not on file     Active member of club or organization: Not on file     Attends meetings of clubs or organizations: Not on file     Relationship status: Not on file    Intimate partner violence     Fear of current or ex partner: Not on file     Emotionally abused: Not on file     Physically abused: Not on file     Forced sexual activity: Not on file   Other Topics Concern    Not on file   Social History Narrative    Not on file           ROS:  [x] All negative/unchanged except if checked.  Explain positive(checked items) below:  [] Constitutional  [] Eyes  [] Ear/Nose/Mouth/Throat  [] Respiratory  [] CV  [] GI  []   [] Musculoskeletal  [] Skin/Breast  [] Neurological  [] Endocrine  [] Heme/Lymph  [] Allergic/Immunologic    Explanation:     MEDICATIONS:    Current Facility-Administered Medications:     FLUoxetine (PROZAC) capsule 10 mg, 10 mg, Oral, Daily, Eliceo Guillermo MD, 10 mg at 07/15/20 9066    hydrOXYzine (ATARAX) tablet 50 mg, 50 mg, Oral, TID PRN, Eliceo Guillermo MD    haloperidol lactate (HALDOL) injection 5 mg, 5 mg, Intramuscular, Q4H PRN **OR** haloperidol (HALDOL) tablet 5 mg, 5 mg, Oral, Q4H PRN, Eliceo Guillermo MD    nicotine (NICODERM CQ) 14 MG/24HR 1 patch, 1 patch, Transdermal, Daily, Heraclio Alfredo SALGADO MD    traZODone (DESYREL) tablet 50 mg, 50 mg, Oral, Nightly PRN, Shell Pipo Alfredo SALGADO MD      Examination:  /72   Pulse 88   Temp 98.3 °F (36.8 °C) (Oral)   Resp 14   Ht 5' 5\" (1.651 m)   Wt 155 lb (70.3 kg)   SpO2 99%   BMI 25.79 kg/m²   Gait - steady  Medication side effects(SE): none    Mental Status Examination:    Level of consciousness:  within normal limits   Appearance:  fair grooming and fair hygiene  Behavior/Motor:  no abnormalities noted  Attitude toward examiner: cooperative  Speech:  spontaneous, normal rate and normal volume   Mood: anxious  Affect:  mood congruent  Thought processes:  linear, goal directed and coherent   Thought content:  Homicidal ideation - none  Suicidal Ideation:  denies suicidal ideation  Delusions:  no evidence of delusions  Perceptual Disturbance:  denies any perceptual disturbance  Cognition:  oriented to person, place, and time   Concentration intact  Insight poor   Judgement fair     ASSESSMENT:   Patient symptoms are:  [] Well controlled  [x] Improving  [] Worsening  [] No change      Diagnosis: *Active Problems:    MDD (major depressive disorder), recurrent severe, without psychosis ([facility] [address]    Depression with suicidal ideation    Alcohol use disorder, mild, abuse  Resolved Problems:    * No resolved hospital problems. *      LABS:    Recent Labs     07/13/20  1414   WBC 8.2   HGB 12.8        Recent Labs     07/13/20  1414      K 3.9   *   CO2 21   BUN 9   CREATININE 0.80   GLUCOSE 117*     Recent Labs     07/13/20  1414   BILITOT 0.20*   ALKPHOS 49   AST 37*   ALT 14     Lab Results   Component Value Date    BARBSCNU NEGATIVE 07/13/2020    LABBENZ NEGATIVE 07/13/2020    LABMETH NEGATIVE 07/13/2020    PPXUR NOT REPORTED 07/13/2020     Lab Results   Component Value Date    TSH 0.99 07/13/2020     No results found for: LITHIUM  No results found for: VALPROATE, CBMZ    RISK ASSESSMENT: Moderate risk of suicide. Low risk of harm to others    Treatment Plan:  Reviewed current Medications with the patient. Increase Prozac to 20mg daily. Risks, benefits, side effects, drug-to-drug interactions and alternatives to treatment were discussed. The patient  consented to treatment. Encourage patient to attend group and other milieu activities.   Discharge planning discussed with the patient and treatment team.    PSYCHOTHERAPY/COUNSELING:  Patient was seen 1:1 for 20 minutes, other than E&M time spent, focusing on      CBT principles helping the patient examine evidence for his symptoms, his past diagnoses and the impact of drugs on his symptoms with the aim to reduce substance use and improve compliance with medication        - Motivational interviewing to assess the stage of change and assessing patient readiness to quit substance use. - Focusing on negative cognition and maladaptive thoughts, which is feeding and maintaining the depression symptoms     - DBT techniques explained to the patient    [] Therapeutic interview  [x] Supportive  [] CBT  [] Ongoing  [] Other    [x] Patient continues to need, on a daily basis, active treatment furnished directly by or requiring the supervision of inpatient psychiatric personnel      Anticipated Length of stay:2-3 days                                         João Schroeder is a 27 y.o. female being evaluated by a Virtual Visit (video visit) encounter to address concerns as mentioned above. A caregiver was present in the room along with the patient. Pursuant to the emergency declaration under the [facility], [address] [place] [address] authority and the [employer] Act, this Virtual Visit was conducted with patient's (and/or legal guardian's) consent, to reduce the patient's risk of exposure to COVID-19 and provide necessary medical care. Services were provided through a video synchronous discussion virtually to substitute for in-person visit by provider. Patient is present at [facility]  and I am physically present at my home in [place] [address], [place] on 7/15/2020 at 10:42 AM    An electronic signature was used to authenticate this note. **This report has been created using voice recognition software. It may contain minor errors which are inherent in voice recognition technology. **

## 2020-07-15 NOTE — GROUP NOTE
Group Therapy Note    Date: 7/14/2020    Group Start Time: 2030  Group End Time: 2100  Group Topic: Wrap-Up    [facility]    Author Diego, FRED        Group Therapy Note    Attendees: 12           Status After Intervention:  Unchanged    Participation Level: Interactive    Participation Quality: Appropriate      Speech:  normal      Thought Process/Content: Logical      Affective Functioning: Congruent      Mood: anxious      Level of consciousness:  Alert and Oriented x4      Response to Learning: Progressing to goal      Endings: None Reported    Modes of Intervention: Socialization      Discipline Responsible: Registered Nurse      Signature:  Diana Singh RN

## 2020-07-15 NOTE — GROUP NOTE
Group Therapy Note    Date: 7/14/2020    Group Start Time: 1000  Group End Time: 2558  Group Topic: Group Therapy    [facility]    [address] [facility] [address] [facility] D    Attendees: 12/20  Patient's Goal:  To actively participate in psychotherapy group and remain in the here-and-now and to actively participates in group as it relates to finding ways to fight the stigma of mental illness and ways to support, educate and advocate others on mental health issues    Notes:   Pt was pleasant and cooperative      Status After Intervention:  Improved     Participation Level: Active Listener and Interactive     Participation Quality: Appropriate and Attentive      Speech:  normal      Thought Process/Content: Logical      Affective Functioning: flat     Mood: depressed       Level of consciousness:  Alert       Response to Learning: Able to verbalize current knowledge/experience and Progressing to goal      Endings: None Reported     Modes of Intervention: Education, Support and Problem-solving     Discipline Responsible: Clinical     Signature: Jian Lacy.  You Sanchez, MSW, LSW

## 2020-07-15 NOTE — CONSULTS
Allergies:     Latex; Nyquil hbp cold & flu  [dm-doxylamine-acetaminophen]; and Oseltamivir    Social History:     Tobacco:    reports that she has quit smoking. She has never used smokeless tobacco.  Alcohol:      reports previous alcohol use of about 5.0 standard drinks of alcohol per week. Drug Use:  reports no history of drug use. Family History:     Family History   Problem Relation Age of Onset    Diabetes Paternal Grandmother     Cancer Maternal Grandmother        Review of Systems:     Positive and Negative as described in HPI. CONSTITUTIONAL:  negative for fevers, chills, sweats, fatigue, weight loss  HEENT:  negative for vision, hearing changes, runny nose, throat pain  RESPIRATORY:  negative for shortness of breath, cough, congestion, wheezing. CARDIOVASCULAR:  negative for chest pain, palpitations. GASTROINTESTINAL:  negative for nausea, vomiting, diarrhea, constipation, change in bowel habits, abdominal pain   GENITOURINARY:  negative for difficulty of urination, burning with urination, frequency   INTEGUMENT:  negative for rash, skin lesions, easy bruising   HEMATOLOGIC/LYMPHATIC:  negative for swelling/edema   ALLERGIC/IMMUNOLOGIC:  negative for urticaria , itching  ENDOCRINE:  negative increase in drinking, increase in urination, hot or cold intolerance  MUSCULOSKELETAL:  negative joint pains, muscle aches, swelling of joints  NEUROLOGICAL:  negative for headaches, dizziness, lightheadedness, numbness, pain, tingling extremities      Physical Exam:     /72   Pulse 88   Temp 98.3 °F (36.8 °C) (Oral)   Resp 14   Ht 5' 5\" (1.651 m)   Wt 155 lb (70.3 kg)   SpO2 99%   BMI 25.79 kg/m²   Temp (24hrs), Av.3 °F (36.8 °C), Min:98.2 °F (36.8 °C), Max:98.3 °F (36.8 °C)    No results for input(s): POCGLU in the last 72 hours.   No intake or output data in the 24 hours ending 07/15/20 1630    General Appearance:  alert, well appearing, and in no acute distress  Mental status: oriented to person, place, and time with normal affect  Head:  normocephalic, atraumatic. Eye: no icterus, redness, pupils equal and reactive, extraocular eye movements intact, conjunctiva clear  Ear: normal external ear, no discharge, hearing intact  Nose:  no drainage noted  Mouth: mucous membranes moist  Neck: supple, no carotid bruits, thyroid not palpable  Lungs: Bilateral equal air entry, clear to ausculation, no wheezing, rales or rhonchi, normal effort  Cardiovascular: normal rate, regular rhythm, no murmur, gallop, rub. Abdomen: Soft, nontender, nondistended, normal bowel sounds, no hepatomegaly or splenomegaly  Neurologic: There are no new focal motor or sensory deficits, normal muscle tone and bulk, no abnormal sensation, normal speech, cranial nerves II through XII grossly intact  Skin: No gross lesions, rashes, bruising or bleeding on exposed skin area  Extremities:  peripheral pulses palpable, no pedal edema or calf pain with palpation  Multiple superficial scars noted in the left hand and abdominal wall from self-inflicted wound patient examined in the presence of the nurse and room partner    Investigations:      Laboratory Testing:  No results found for this or any previous visit (from the past 24 hour(s)). Imaging/Diagonstics:    Assessment :      Primary Problem  <principal problem not specified>    Active Hospital Problems    Diagnosis Date Noted    Depression with suicidal ideation [F32.9, R45.851] 07/14/2020    Alcohol use disorder, mild, abuse [F10.10]     [facility] [F33.2] 12/29/2015       Plan:     1. Daily alcohol abuse overdose on Wellbutrin  2. No signs of complicated withdrawal supportive care no need of benzodiazepine  3. Counseled against the use of alcohol  4.  We will sign off please call as needed    Consultations:   IP CONSULT TO HISTORY AND PHYSICAL  IP CONSULT TO INTERNAL MEDICINE      Kaylie Landis MD  7/15/2020  4:30 PM    Copy sent to Dr. Cristal Alfaro, APRN - CNP    Please note that this chart was generated using voice recognition Dragon dictation software. Although every effort was made to ensure the accuracy of this automated transcription, some errors in transcription may have occurred.

## 2020-07-16 PROCEDURE — 90833 PSYTX W PT W E/M 30 MIN: CPT | Performed by: PSYCHIATRY & NEUROLOGY

## 2020-07-16 PROCEDURE — 6370000000 HC RX 637 (ALT 250 FOR IP): Performed by: PSYCHIATRY & NEUROLOGY

## 2020-07-16 PROCEDURE — 99232 SBSQ HOSP IP/OBS MODERATE 35: CPT | Performed by: PSYCHIATRY & NEUROLOGY

## 2020-07-16 PROCEDURE — 1240000000 HC EMOTIONAL WELLNESS R&B

## 2020-07-16 RX ADMIN — FLUOXETINE HYDROCHLORIDE 20 MG: 20 CAPSULE ORAL at 09:41

## 2020-07-16 NOTE — GROUP NOTE
Group Therapy Note    Date: 7/16/2020    Group Start Time: 1600  Group End Time: 36  Group Topic: Group Documentation    [facility]    Ousmane Barnett RN        Group Therapy Note    Attendees:          Patient's Goal:  Facing anxiety    Notes:  New ways to deal with anxiety    Status After Intervention:  Improved    Participation Level:  Active Listener    Participation Quality: Attentive      Speech:  normal      Thought Process/Content: Logical      Affective Functioning: Congruent      Mood: depressed      Level of consciousness:  Alert      Response to Learning: Capable of insight      Endings: None Reported    Modes of Intervention: Education      Discipline Responsible: Registered Nurse      Signature:  Ousmane Barnett RN

## 2020-07-16 NOTE — PLAN OF CARE
Problem: Altered Mood, Depressive Behavior:  Goal: Able to verbalize acceptance of life and situations over which he or she has no control  Description: Able to verbalize acceptance of life and situations over which he or she has no control  Outcome: Ongoing    Problem: Altered Mood, Depressive Behavior:  Goal: Able to verbalize and/or display a decrease in depressive symptoms      Problem: Altered Mood, Psychotic Behavior:  Goal: Able to verbalize decrease in frequency and intensity of hallucinations     Patient denies suicidal homicidal ideations this shift, she admits to general anxiety related to admission and medications. She is out and social with her peers and able to communicate her needs appropriately. Q 15 min safety checks remain in place.

## 2020-07-16 NOTE — GROUP NOTE
Group Therapy Note    Date: 2020    Group Start Time: 1330  Group End Time: 1400  Group Topic: Healthy Living/Wellness    JANKI Mendoza        Group Therapy Note    Attendees:          Patient's Goal:  ***    Notes:  ***    Status After Intervention:  {Status After Intervention:693202532}    Participation Level: {Participation Level:401481647}    Participation Quality: {[facility] PARTICIPATION QUALITY:525577416}      Speech:  {ED  CD_SPEECH:02993}      Thought Process/Content: {Thought Process/Content:645800044}      Affective Functioning: {Affective Functionin}      Mood: {Mood:881922195}      Level of consciousness:  {Level of consciousness:575795786}      Response to Learnin [facility] Responses to Learnin}      Endings: {[facility] Endings:83778}    Modes of Intervention: {[facility] Modes of Intervention:964822075}      Discipline Responsible: {[facility] Multidisciplinary:299405112}      Signature:  JANKI ARTEAGA

## 2020-07-16 NOTE — PLAN OF CARE
Problem: Altered Mood, Depressive Behavior:  Goal: Able to verbalize and/or display a decrease in depressive symptoms  Description: Able to verbalize and/or display a decrease in depressive symptoms  Pt denies thoughts of self harm and is agreeable to seeking out should thoughts of self harm arise. Safe environment maintained. Q15 minute checks for safety cont per unit policy. Will cont to monitor for safety and provides support and reassurance as needed. 7/16/2020 1215 by Hamlet Fink RN  Outcome: Ongoing  Note: Patient denies any thoughts of self harm or hallucinations. Out for meals and attending groups. Medication compliant and behavior controlled. Reports being ready for discharge.

## 2020-07-16 NOTE — PROGRESS NOTES
BEHAVIORAL HEALTH FOLLOW-UP NOTE     7/16/2020     Patient was seen and examined in person, Chart reviewed   Patient's case discussed with staff/team    Chief Complaint: Suicide attempt by overdose    Interim History:   -Feeling better. Denies SI. Has been taking Prozac 20mg daily. Denies any problems with that.   -has been speaking to father on the phone and he has been very supportive.    -    Appetite:  [x] Normal/Unchanged  [] Increased  [] Decreased      Sleep:       [x] Normal/Unchanged  [] Fair       [] Poor              Energy:    [] Normal/Unchanged  [] Increased  [x] Decreased        Aggression:  [] yes  [x] no    Patient is [x] able  [] unable to CONTRACT FOR SAFETY ON THE UNIT    PAST MEDICAL/PSYCHIATRIC HISTORY:   Past Medical History:   Diagnosis Date    Asthma     Depression     Hashimoto's thyroiditis        FAMILY/SOCIAL HISTORY:  Family History   Problem Relation Age of Onset    Diabetes Paternal Grandmother     Cancer Maternal Grandmother      Social History     Socioeconomic History    Marital status: Single     Spouse name: Not on file    Number of children: Not on file    Years of education: Not on file    Highest education level: Not on file   Occupational History    Not on file   Social Needs    Financial resource strain: Not on file    Food insecurity     Worry: Not on file     Inability: Not on file    Transportation needs     Medical: Not on file     Non-medical: Not on file   Tobacco Use    Smoking status: Former Smoker    Smokeless tobacco: Never Used    Tobacco comment: was only an occas smoker   Substance and Sexual Activity    Alcohol use: Not Currently     Alcohol/week: 5.0 standard drinks     Types: 5 Cans of beer per week     Comment: every other week    Drug use: No    Sexual activity: Yes     Partners: Male   Lifestyle    Physical activity     Days per week: Not on file     Minutes per session: Not on file    Stress: Not on file   Relationships    Social connections     Talks on phone: Not on file     Gets together: Not on file     Attends [employer] service: Not on file     Active member of club or organization: Not on file     Attends meetings of clubs or organizations: Not on file     Relationship status: Not on file    Intimate partner violence     Fear of current or ex partner: Not on file     Emotionally abused: Not on file     Physically abused: Not on file     Forced sexual activity: Not on file   Other Topics Concern    Not on file   Social History Narrative    Not on file           ROS:  [x] All negative/unchanged except if checked.  Explain positive(checked items) below:  [] Constitutional  [] Eyes  [] Ear/Nose/Mouth/Throat  [] Respiratory  [] CV  [] GI  []   [] Musculoskeletal  [] Skin/Breast  [] Neurological  [] Endocrine  [] Heme/Lymph  [] Allergic/Immunologic    Explanation:     MEDICATIONS:    Current Facility-Administered Medications:     FLUoxetine (PROZAC) capsule 20 mg, 20 mg, Oral, Daily, Gavin Blackman MD, 20 mg at 07/16/20 0941    magnesium hydroxide (MILK OF MAGNESIA) 400 MG/5ML suspension 30 mL, 30 mL, Oral, Daily PRN, Gavin Blackman MD, 30 mL at 07/15/20 1334    hydrOXYzine (ATARAX) tablet 50 mg, 50 mg, Oral, TID PRN, Gavin Blackman MD    haloperidol lactate (HALDOL) injection 5 mg, 5 mg, Intramuscular, Q4H PRN **OR** haloperidol (HALDOL) tablet 5 mg, 5 mg, Oral, Q4H PRN, Gavin Blackman MD    nicotine (NICODERM CQ) 14 MG/24HR 1 patch, 1 patch, Transdermal, Daily, Heraclio SALGADO MD    traZODone (DESYREL) tablet 50 mg, 50 mg, Oral, Nightly PRN, Isaura SALGADO MD      Examination:  /68   Pulse 78   Temp 97.7 °F (36.5 °C) (Oral)   Resp 14   Ht 5' 5\" (1.651 m)   Wt 155 lb (70.3 kg)   SpO2 99%   BMI 25.79 kg/m²   Gait - steady  Medication side effects(SE): none    Mental Status Examination:    Level of consciousness:  within normal limits   Appearance: Good grooming and good hygiene  Behavior/Motor:  no abnormalities noted  Attitude toward examiner:  cooperative  Speech:  spontaneous, normal rate and normal volume   Mood: Euthymic  Affect:  mood congruent  Thought processes:  linear, goal directed and coherent   Thought content:  Homicidal ideation - none  Suicidal Ideation:  denies suicidal ideation  Delusions:  no evidence of delusions  Perceptual Disturbance:  denies any perceptual disturbance  Cognition:  oriented to person, place, and time   Concentration intact  Insight poor   Judgement fair     ASSESSMENT:   Patient symptoms are:  [] Well controlled  [x] Improving  [] Worsening  [] No change      Diagnosis: *Active Problems:    MDD (major depressive disorder), recurrent severe, without psychosis ([facility] [address]    Depression with suicidal ideation    Alcohol use disorder, mild, abuse  Resolved Problems:    * No resolved hospital problems. *      LABS:    Recent Labs     07/13/20  1414   WBC 8.2   HGB 12.8        Recent Labs     07/13/20  1414      K 3.9   *   CO2 21   BUN 9   CREATININE 0.80   GLUCOSE 117*     Recent Labs     07/13/20  1414   BILITOT 0.20*   ALKPHOS 49   AST 37*   ALT 14     Lab Results   Component Value Date    BARBSCNU NEGATIVE 07/13/2020    LABBENZ NEGATIVE 07/13/2020    LABMETH NEGATIVE 07/13/2020    PPXUR NOT REPORTED 07/13/2020     Lab Results   Component Value Date    TSH 0.99 07/13/2020     No results found for: LITHIUM  No results found for: VALPROATE, CBMZ    RISK ASSESSMENT: Moderate risk of suicide. Low risk of harm to others    Treatment Plan:  Reviewed current Medications with the patient. Increase Prozac to 20mg daily. Risks, benefits, side effects, drug-to-drug interactions and alternatives to treatment were discussed. The patient  consented to treatment. Encourage patient to attend group and other milieu activities.   Discharge planning discussed with the patient and treatment team.    PSYCHOTHERAPY/COUNSELING:  Patient was seen 1:1 for 20 minutes, other

## 2020-07-16 NOTE — GROUP NOTE
Group Therapy Note    Date: 7/16/2020    Group Start Time: 1430  Group End Time: 1958  Group Topic: Cognitive Skills    [facility]    JANKI Greenfield        Group Therapy Note    Attendees: 11/16         Patient's Goal:  To increase social interaction and to practice self expression, explore positive coping skills and resources, and communication skills. Notes: Pt participated fully in group task . Pt was able to practice practice self expression using creative expression, explore positive coping skills and resources, and communication skills through individual sharing and group discussion. Pt was supportive of peers     Status After Intervention:  Improved     Participation Level:  Active Listener and Interactive     Participation Quality: Appropriate, Attentive, Sharing and Supportive        Speech:  normal        Thought Process/Content: Logical,linear          Affective Functioning: Congruent        Mood: euthymic        Level of consciousness:  Alert, Oriented x4 and Attentive        Response to Learning: Able to verbalize current knowledge/experience, Able to verbalize/acknowledge new learning, Able to retain information and Progressing to goal        Endings: None Reported     Modes of Intervention: Education, Support, Socialization, Exploration, Clarifying and Problem-solving        Discipline Responsible: Psychoeducational Specialist        Signature:  Dino Montgomery

## 2020-07-16 NOTE — GROUP NOTE
Group Therapy Note    Date: 7/16/2020    Group Start Time: 0900  Group End Time: 2223  Group Topic: Community Meeting    JANKI [place]        Group Therapy Note    Attendees:12         Patient's Goal:  To improve decision making skills     Notes:   Pt was pleasant and cooperative     Status After Intervention:  Improved    Participation Level:  Active Listener and Interactive    Participation Quality: Appropriate, Attentive and Sharing      Speech:  normal      Thought Process/Content: Logical      Affective Functioning: Congruent      Mood: euthymic      Level of consciousness:  Alert and Oriented x4      Response to Learning: Able to verbalize current knowledge/experience and Progressing to goal      Endings: None Reported    Modes of Intervention: Education, Support, Socialization and Problem-solving      Discipline Responsible: Psychoeducational Specialist      Signature:  Keven Chaudhari

## 2020-07-17 VITALS
DIASTOLIC BLOOD PRESSURE: 65 MMHG | HEIGHT: 65 IN | HEART RATE: 93 BPM | OXYGEN SATURATION: 99 % | SYSTOLIC BLOOD PRESSURE: 99 MMHG | BODY MASS INDEX: 25.83 KG/M2 | WEIGHT: 155 LBS | TEMPERATURE: 97.2 F | RESPIRATION RATE: 14 BRPM

## 2020-07-17 PROCEDURE — 99238 HOSP IP/OBS DSCHRG MGMT 30/<: CPT | Performed by: PSYCHIATRY & NEUROLOGY

## 2020-07-17 PROCEDURE — 6370000000 HC RX 637 (ALT 250 FOR IP): Performed by: PSYCHIATRY & NEUROLOGY

## 2020-07-17 RX ORDER — FLUOXETINE HYDROCHLORIDE 20 MG/1
20 CAPSULE ORAL DAILY
Qty: 30 CAPSULE | Refills: 3 | Status: SHIPPED | OUTPATIENT
Start: 2020-07-18

## 2020-07-17 RX ORDER — TRAZODONE HYDROCHLORIDE 50 MG/1
50 TABLET ORAL NIGHTLY PRN
Qty: 30 TABLET | Refills: 0 | Status: SHIPPED | OUTPATIENT
Start: 2020-07-17

## 2020-07-17 RX ADMIN — FLUOXETINE HYDROCHLORIDE 20 MG: 20 CAPSULE ORAL at 08:26

## 2020-07-17 NOTE — GROUP NOTE
Group Therapy Note    Date: 7/17/2020    Group Start Time: 1000  Group End Time: 1030  Group Topic: Psychotherapy    [facility]    Zackary Smalls        Group Therapy Note    Attendees: 10/18         Patient's Goal:  PT will demonstrate increased interpersonal interaction and a clear understanding on multiple types of coping skills relating to the here-and-now therapeutic practice  . Notes:  Patient is making progress, AEB participating in group discussion, actively listening, and supporting other group members. PT participates in group and encourages others to participate     Status After Intervention:  Improved    Participation Level: Active Listener    Participation Quality: Appropriate, Attentive and Sharing      Speech:  normal      Thought Process/Content: Logical      Affective Functioning: Flat      Mood: irritable      Level of consciousness:  Alert, Oriented x4 and Attentive      Response to Learning: Able to verbalize current knowledge/experience and Progressing to goal      Endings: None Reported    Modes of Intervention: Support, Socialization, Exploration, Clarifying and Problem-solving      Discipline Responsible: /Counselor      Signature:   Zackary Smalls

## 2020-07-17 NOTE — DISCHARGE SUMMARY
DISCHARGE SUMMARY      Patient ID:  Didi Maddox  [id number]  60 y.o.  1995    Admit date: 7/13/2020    Discharge date and time: 7/17/2020    Disposition: Home     Admitting Physician: Darius Davenport MD     Discharge Physician: Dr Deshaun Rousseau MD    Ad[facility] Diagnoses: [facility] [F31.9]  Depression with suicidal ideation [F32.9, R45.851]    Admission Condition: poor    Discharged Condition: stable    Admission Circumstance: The patient was admitted following an overdose of her prescribed Wellbutrin. The patient reported with hallucinations of spiders after taking the overdose.   She was stressed about relationship difficulties with her boyfriend      PAST MEDICAL/PSYCHIATRIC HISTORY:   Past Medical History:   Diagnosis Date    Asthma     Depression     Hashimoto's thyroiditis        FAMILY/SOCIAL HISTORY:  Family History   Problem Relation Age of Onset    Diabetes Paternal Grandmother     Cancer Maternal Grandmother      Social History     Socioeconomic History    Marital status: Single     Spouse name: Not on file    Number of children: Not on file    Years of education: Not on file    Highest education level: Not on file   Occupational History    Not on file   Social Needs    Financial resource strain: Not on file    Food insecurity     Worry: Not on file     Inability: Not on file    Transportation needs     Medical: Not on file     Non-medical: Not on file   Tobacco Use    Smoking status: Former Smoker    Smokeless tobacco: Never Used    Tobacco comment: was only an occas smoker   Substance and Sexual Activity    Alcohol use: Not Currently     Alcohol/week: 5.0 standard drinks     Types: 5 Cans of beer per week     Comment: every other week    Drug use: No    Sexual activity: Yes     Partners: Male   Lifestyle    Physical activity     Days per week: Not on file     Minutes per session: Not on file    Stress: Not on file   Relationships    Social connections     Talks on phone: Not on file     Gets together: Not on file     Attends Mandaen service: Not on file     Active member of club or organization: Not on file     Attends meetings of clubs or organizations: Not on file     Relationship status: Not on file    Intimate partner violence     Fear of current or ex partner: Not on file     Emotionally abused: Not on file     Physically abused: Not on file     Forced sexual activity: Not on file   Other Topics Concern    Not on file   Social History Narrative    Not on file       MEDICATIONS:    Current Facility-Administered Medications:     FLUoxetine (PROZAC) capsule 20 mg, 20 mg, Oral, Daily, Katie Razo MD, 20 mg at 07/17/20 0826    magnesium hydroxide (MILK OF MAGNESIA) 400 MG/5ML suspension 30 mL, 30 mL, Oral, Daily PRN, Katie Razo MD, 30 mL at 07/15/20 1334    hydrOXYzine (ATARAX) tablet 50 mg, 50 mg, Oral, TID PRN, Katie Razo MD    haloperidol lactate (HALDOL) injection 5 mg, 5 mg, Intramuscular, Q4H PRN **OR** haloperidol (HALDOL) tablet 5 mg, 5 mg, Oral, Q4H PRN, Katie Razo MD    nicotine (NICODERM CQ) 14 MG/24HR 1 patch, 1 patch, Transdermal, Daily, Heraclio SALGADO MD    traZODone (DESYREL) tablet 50 mg, 50 mg, Oral, Nightly PRN, Tres SALGADO MD    Examination:  BP 99/65   Pulse 93   Temp 97.2 °F (36.2 °C) (Oral)   Resp 14   Ht 5' 5\" (1.651 m)   Wt 155 lb (70.3 kg)   SpO2 99%   BMI 25.79 kg/m²   Gait - steady    HOSPITAL COURSE[de-identified]  Following admission to the hospital, patient had a complete physical exam and blood work up, which was unremarkable. Patient was monitored closely with suicide precaution  Patient was started on Prozac  Was encouraged to participate in group and other milieu activity  Patient started to feel better with this combination of treatment. Significant progress in the symptoms since admission.     Mood is improved  The patient denies AVH or paranoid thoughts  The patient denies any hopelessness or worthlessness  No active SI/HI  Appetite:  [x] Normal  [] Increased  [] Decreased    Sleep:       [x] Normal  [] Fair       [] Poor            Energy:    [x] Normal  [] Increased  [] Decreased     SI [] Present  [x] Absent  HI  []Present  [x] Absent   Aggression:  [] yes  [] no  Patient is [x] able  [] unable to CONTRACT FOR SAFETY   Medication side effects(SE):  [x] None(Psych. Meds.) [] Other      Mental Status Examination on discharge:    Level of consciousness:  within normal limits   Appearance:  well-appearing  Behavior/Motor:  no abnormalities noted  Attitude toward examiner:  attentive and good eye contact  Speech:  spontaneous, normal rate and normal volume   Mood: euthymic  Affect:  mood congruent  Thought processes:  linear, goal directed and coherent   Thought content:  Suicidal Ideation:  denies suicidal ideation  Delusions:  no evidence of delusions  Perceptual Disturbance:  denies any perceptual disturbance  Cognition:  oriented to person, place, and time   Concentration intact  Memory intact  Insight good   Judgement fair   Fund of Knowledge adequate      ASSESSMENT:  Patient symptoms are:  [x] Well controlled  [x] Improving  [] Worsening  [] No change      Diagnosis:  Active Problems:    MDD (major depressive disorder), recurrent severe, without psychosis ([facility] [address]    Depression with suicidal ideation    Alcohol use disorder, mild, abuse  Resolved Problems:    * No resolved hospital problems. *      LABS:    No results for input(s): WBC, HGB, PLT in the last 72 hours. No results for input(s): NA, K, CL, CO2, BUN, CREATININE, GLUCOSE in the last 72 hours. No results for input(s): BILITOT, ALKPHOS, AST, ALT in the last 72 hours.   Lab Results   Component Value Date    BARBSCNU NEGATIVE 07/13/2020    LABBENZ NEGATIVE 07/13/2020    LABMETH NEGATIVE 07/13/2020    PPXUR NOT REPORTED 07/13/2020     Lab Results   Component Value Date    TSH 0.99 07/13/2020     No results found for: LITHIUM  No results found National Emergencies Act, [facility] waiver authority and the [place] and [employer] General Act, this Virtual Visit was conducted with patient's (and/or legal guardian's) consent, to reduce the patient's risk of exposure to COVID-19 and provide necessary medical care. Services were provided through a video synchronous discussion virtually to substitute for in-person visit by provider. Patient is present at [facility]  and I am physically present at my home in [place] [address], [place] on 7/17/2020 at 11:00 AM    An electronic signature was used to authenticate this note. **This report has been created using voice recognition software. It may contain minor errors which are inherent in voice recognition technology. **

## 2020-07-17 NOTE — GROUP NOTE
Group Therapy Note    Date: 7/17/2020    Group Start Time: 1330  Group End Time: 7542  Group Topic: Cognitive Skills    COLLIN CrS    Pt did not attend 1330 cognitive skills group d/t resting in room despite staff invitation to attend. 1:1 talk time offered as alternative to group session, pt declined.               Signature:  Carl Banegas

## 2020-07-17 NOTE — PROGRESS NOTES
CLINICAL PHARMACY NOTE: MEDS TO [address] Select Patient?: No  Total # of Prescriptions Filled: 1   The following medications were delivered to the patient:  · Fluoxetine  ·   Total # of Interventions Completed: 0  Time Spent (min): 30    Additional Documentation:

## 2020-07-17 NOTE — BH NOTE
Patient given tobacco quitline number 98187776564 at this time, refusing to call at this time, states \" I just dont want to quit now\"- patient given information as to the dangers of long term tobacco use. Continue to reinforce the importance of tobacco cessation.

## 2020-07-17 NOTE — PLAN OF CARE
Problem: Altered Mood, Depressive Behavior:  Goal: Able to verbalize acceptance of life and situations over which he or she has no control    Outcome: Ongoing     Problem: Altered Mood, Depressive Behavior:  Goal: Able to verbalize and/or display a decrease in depressive symptoms    Outcome: Ongoing     Patient is accepting of admission and criteria for discharge. She has been medication compliant and behaviorally controlled. Patient denies suicidal homicidal ideations and hallucinations. She is anticipating discharge tomorrow and is looking forward to seeing her family. Q 15 min safety checks remain in place.

## 2020-07-17 NOTE — GROUP NOTE
Group Therapy Note    Date: 7/16/2020    Group Start Time: 2030  Group End Time: 2100  Group Topic: Wrap-Up    [facility]    Oscar Lau RN        Group Therapy Note    Attendees: 8/17         Patient's Goal:  goals    Notes:      Status After Intervention:  Improved    Participation Level:  Active Listener    Participation Quality: Appropriate      Speech:  normal      Thought Process/Content: Logical      Affective Functioning: Congruent      Mood: anxious      Level of consciousness:  Alert and Oriented x4      Response to Learning: Able to verbalize current knowledge/experience      Endings: None Reported    Modes of Intervention: Support      Discipline Responsible: Registered Nurse      Signature:  Oscar Lau RN

## 2020-07-17 NOTE — GROUP NOTE
Group Therapy Note    Date: 7/17/2020    Group Start Time: 0900  Group End Time: 1117  Group Topic: Community Meeting    [address] [facility] JAYMIEI GARRICK Julien, [address]        Group Therapy Note    Attendees: 9/18            Patient's Goal:  To increase social interaction and to explore and identify short term goals r/t wellness and recovery. RT discussed group schedule and unit structure/resources and encouraged pts to be engaged in their treatment. Pts were given opportunities to ask questions. Notes: Pt participated in group task and was able to identify short term goals r/t wellness and recovery. Pt is pleasant and supportive of peers        Status After Intervention:  Improved     Participation Level:  Active Listener and Interactive     Participation Quality: Appropriate, Attentive, Sharing         Speech:   Normal     Thought Process/Content: Logical,linear     Affective Functioning: Congruent   Mood: euthymic        Level of consciousness:  Alert, and Attentive        Response to Learning: Able to verbalize current knowledge/experience, Able to verbalize/acknowledge new learning, and Progressing to goal        Endings: None Reported     Modes of Intervention: Education, Support, Socialization, Exploration, Clarifying and Problem-solving        Discipline Responsible: Psychoeducational Specialist        Signature:  Betsy Medina

## 2022-07-31 ENCOUNTER — HOSPITAL ENCOUNTER (EMERGENCY)
Age: 27
Discharge: HOME | End: 2022-07-31

## 2022-12-19 DIAGNOSIS — M25.562 PAIN IN BOTH KNEES, UNSPECIFIED CHRONICITY: Primary | ICD-10-CM

## 2022-12-19 DIAGNOSIS — M25.561 PAIN IN BOTH KNEES, UNSPECIFIED CHRONICITY: Primary | ICD-10-CM

## 2022-12-20 ENCOUNTER — OFFICE VISIT (OUTPATIENT)
Dept: ORTHOPEDIC SURGERY | Age: 27
End: 2022-12-20
Payer: COMMERCIAL

## 2022-12-20 VITALS — BODY MASS INDEX: 28.32 KG/M2 | HEIGHT: 65 IN | RESPIRATION RATE: 16 BRPM | WEIGHT: 170 LBS | OXYGEN SATURATION: 100 %

## 2022-12-20 DIAGNOSIS — M25.562 CHRONIC PAIN OF BOTH KNEES: ICD-10-CM

## 2022-12-20 DIAGNOSIS — M22.2X1 PATELLOFEMORAL DISORDER OF BOTH KNEES: Primary | ICD-10-CM

## 2022-12-20 DIAGNOSIS — G89.29 CHRONIC PAIN OF BOTH KNEES: ICD-10-CM

## 2022-12-20 DIAGNOSIS — M22.2X2 PATELLOFEMORAL DISORDER OF BOTH KNEES: Primary | ICD-10-CM

## 2022-12-20 DIAGNOSIS — M25.561 CHRONIC PAIN OF BOTH KNEES: ICD-10-CM

## 2022-12-20 PROCEDURE — 1036F TOBACCO NON-USER: CPT | Performed by: ORTHOPAEDIC SURGERY

## 2022-12-20 PROCEDURE — 99204 OFFICE O/P NEW MOD 45 MIN: CPT | Performed by: ORTHOPAEDIC SURGERY

## 2022-12-20 PROCEDURE — G8427 DOCREV CUR MEDS BY ELIG CLIN: HCPCS | Performed by: ORTHOPAEDIC SURGERY

## 2022-12-20 PROCEDURE — G8484 FLU IMMUNIZE NO ADMIN: HCPCS | Performed by: ORTHOPAEDIC SURGERY

## 2022-12-20 PROCEDURE — G8419 CALC BMI OUT NRM PARAM NOF/U: HCPCS | Performed by: ORTHOPAEDIC SURGERY

## 2022-12-20 RX ORDER — NAPROXEN 500 MG/1
500 TABLET ORAL 2 TIMES DAILY PRN
Qty: 60 TABLET | Refills: 0 | Status: SHIPPED | OUTPATIENT
Start: 2022-12-20

## 2022-12-20 NOTE — PROGRESS NOTES
[address] AND [facility]TS MEDICINE  [place] [address]  Dept: [phone]    Ambulatory Orthopedic Consult      CHIEF COMPLAINT:    Chief Complaint   Patient presents with    Knee Pain     Bilateral        HISTORY OF PRESENT ILLNESS:      The patient is a 24 y.o. female who is being seen for evaluation of the above, which began years ago atraumatically. At today's visit, she is using no brace/assistive device. History is obtained today from:   [x]  the patient     [x]  EMR     []  one family member/friend    []  multiple family members/friends    []  other:      The pain is worse with stairs and after being seated for an extended period of time. Localizes pain anteriorly bilaterally, worse on right than left. REVIEW OF SYSTEMS:  Musculoskeletal: See HPI for pertinent positives     Past Medical History:    She  has a past medical history of Asthma, Depression, and Hashimoto's thyroiditis. Past Surgical History:    She  has a past surgical history that includes Appendectomy. Current Medications:     Current Outpatient Medications:     FLUoxetine (PROZAC) 20 MG capsule, Take 1 capsule by mouth daily, Disp: 30 capsule, Rfl: 3    traZODone (DESYREL) 50 MG tablet, Take 1 tablet by mouth nightly as needed for Sleep, Disp: 30 tablet, Rfl: 0    Norgestimate-Eth Estradiol (SPRINTEC 28 PO), Take by mouth, Disp: , Rfl:      Allergies:    Latex, Nyquil hbp cold & flu  [dm-doxylamine-acetaminophen], and Oseltamivir    Family History:  family history includes Cancer in her maternal grandmother; Diabetes in her paternal grandmother.     Social History:   Social History     Occupational History    Not on file   Tobacco Use    Smoking status: Former    Smokeless tobacco: Never    Tobacco comments:     was only an occas smoker   Vaping Use    Vaping Use: Every day   Substance and Sexual Activity    Alcohol use: Not Currently     Alcohol/week: 5.0 standard drinks     Types: 5 Cans of beer per week     Comment: every other week    Drug use: No    Sexual activity: Yes     Partners: Male     Works on her feet, behavioral health tech    OBJECTIVE:  Resp 16   Ht 5' 5\" (1.651 m)   Wt 170 lb (77.1 kg)   SpO2 100%   BMI 28.29 kg/m²    Psych: awake, alert  Cardio:  well perfused extremities, no cyanosis  Resp:  normal respiratory effort  Musculoskeletal:    right lower extremity:    Vascular: Limb well perfused, compartments soft/compressible. Skin: No erythema/ulcers. Intact. Neurovascular Status:  Grossly neurovascularly intact throughout  Motion:  Grossly able to fire major muscle groups with appropriate/expected AROM  Tenderness to Palpation: Anterior knee   -no significant pain with compression of patella  -Pain with with resisted knee extension  -negative Luan's test for pain  -Negative anterior drawer/Lachman's test/posterior drawer; No instability with varus/valgus stress at 0 and 30° of flexion      left lower extremity:    Vascular: Limb well perfused, compartments soft/compressible. Skin: No erythema/ulcers. Intact. Neurovascular Status:  Grossly neurovascularly intact throughout  Motion:  Grossly able to fire major muscle groups with appropriate/expected AROM  Tenderness to Palpation: Anterior knee   -no significant pain with compression of patella  -Pain with with resisted knee extension  -negative Luan's test for pain  -Negative anterior drawer/Lachman's test/posterior drawer; No instability with varus/valgus stress at 0 and 30° of flexion      RADIOLOGY:   12/20/2022 FINDINGS:  Four weightbearing views (AP, Lateral, Tunnel and [place]) of the bilateral knee were obtained in the office today and reviewed, revealing no acute fracture, dislocation, or radioopaque foreign body/tumor. Overall alignment is satisfactory. IMPRESSION: No acute fracture/dislocation.      Electronically signed by Radha Giang MD Jean      Relevant previous imaging reviewed, both imaging and report(s) as below:    No results found. ASSESSMENT AND PLAN:  Body mass index is 28.29 kg/m². She has bilateral knee pain, likely secondary to patellofemoral disorder. Notably, she has the past medical history as above. She has a history of asthma, recurrent severe major depressive disorder, mild alcohol use disorder, and history of polysubstance abuse. We had a discussion today about the likely diagnosis and its natural history, physical exam and imaging findings, as well as various treatment options in detail. Surgically, we discussed that I recommended conservative management, and did not recommend any surgical intervention at this time. Orders/referrals were placed as below at today's visit. The patient was provided information on how to obtain an over-the-counter soft knee brace. The patient was referred to physical therapy (to focus on vastus medialis obiquus strengthening, core strengthening, closed chain short arc quadriceps exercises, and strengthening of hip external rotators). At today's visit, she was ordered oral NSAIDs as below to be used as needed, and we discussed the appropriate risks. The patient was provided teaching material on this today, and will avoid using multiple NSAIDs at the same time. All questions were answered and the above plan was agreed upon. The patient will return to clinic in 3 months PRN .           At the patient's next visit, depending on how the patient is doing and/or new imaging/labs results, we may consider the following options:    []  Orthotic (OTC)     []  Orthotic (custom)          []  Rocker bottom shoes     []  Brace (OTC)        []  Brace (custom)             []  CAM boot        []  Night splint         []  Heel cups        []  Strap      []  Toe sleeves/splints    []  PT:                     []  Wean out of immobilization   []  Advance activity       [] Topical               []  NSAIDs          []  Pipo         []  Referral:         []  Stress xrays       []  CT         []  MRI        []  Injection:         []  Consider OR      []  Pick OR date    No follow-ups on file. No orders of the defined types were placed in this encounter. No orders of the defined types were placed in this encounter. Dior Bangura MD  Orthopedic Surgery        Please excuse any typos/errors, as this note was created with the assistance of voice recognition software. While intending to generate a document that actually reflects the content of the visit, the document can still have some errors including those of syntax and sound-a-like substitutions which may escape proof reading. In such instances, actual meaning can be extrapolated by context.

## 2023-01-05 ENCOUNTER — HOSPITAL ENCOUNTER (OUTPATIENT)
Dept: PHYSICAL THERAPY | Facility: CLINIC | Age: 28
Setting detail: THERAPIES SERIES
Discharge: HOME | End: 2023-01-05
Payer: COMMERCIAL

## 2023-01-05 PROCEDURE — 97161 PT EVAL LOW COMPLEX 20 MIN: CPT

## 2023-01-05 PROCEDURE — 97110 THERAPEUTIC EXERCISES: CPT

## 2023-01-05 NOTE — CONSULTS
[x] [facility]  Outpatient Rehabilitation &  Therapy  [facility]   [place]: [phone]  F: [phone]      Physical Therapy Lower Extremity Evaluation    Date:  2023  Patient: Daniella Cueto  : 1995  MRN: [id number]  Physician: Dr Briseida Srivastava: Hersjames Draft, 30 visits   Medical Diagnosis: Bilat PFP Rehab Codes: M22.2X1, M22.2X2, M62.81 (Muscle Weakness), M62.9 (Disorder of Muscle), M79.1 (Myalgia), Z73.6 (Limitation of ADLs)   Onset date:    Next Dr's appt.: 3-2023    Subjective:   CC/HPI: Pt is a 24year old female with bilat knee pain that began atraumatically. She saw Dr Aurora Skiff, sent to PT at this time. She has swelling in bilat knees that is worse with activity. RLE>LLE. Worse with squatting, walking. She weightlifts 5x a week. She started lifting in 2022 but pain but got worse and she pushed through it for 2 months. Pain with running. She was given NSAID Rx and is not taking them out of fear of overdosing on them and affecting her liver. She has a history of BMX and feels she is \"accident prone\". She had a mirror cut her LLE knee at age 5. Has not had XR, MRI      PMHx: Notably, she has the past medical history as above. She has a history of asthma, recurrent severe major depressive disorder, mild alcohol use disorder, and history of polysubstance abuse. We had a discussion today about the likely diagnosis and its natural history, physical exam and imaging findings, as well as various treatment options in detail. [x] Refer to full medical chart  In EPIC     Tests: [x] X-Ray: 2022 FINDINGS:  Four weightbearing views (AP, Lateral, Tunnel and [place]) of the bilateral knee were obtained in the office today and reviewed, revealing no acute fracture, dislocation, or radioopaque foreign body/tumor. Overall alignment is satisfactory. IMPRESSION: No acute fracture/dislocation.       Electronically signed by Isi Masterson MD       [] MRI:    [] Other:       Medications:  [x] Refer to full medical record [] None [] Other:  Allergies:       [x] Refer to full medical record [] None [] Other:    ADL/IADL [x] Previously independent with all [x] Currently independent with all Who currently assists the patient with task     [] Previously independent with all except: [] Currently independent with all except:     Bathing  [] Assist [] Assist     Dress/grooming [] Assist [] Assist     Transfer/mobility [] Assist [] Assist     Feeding [] Assist [] Assist     Toileting [] Assist [] Assist     Driving [] Assist [] Assist     Housekeeping [] Assist [] Assist     Grocery shop/meal prep [] Assist [] Assist          Gait Prior level of function Current level of function    [x] Independent  [] Assist [x] Independent  [] Assist   Device: [] Independent [] Independent    [] Straight Cane [] Quad cane [] Straight Cane [] Quad cane    [] Standard walker [] Rolling walker   [] 4 wheeled walker [] Standard walker [] Rolling walker   [] 4 wheeled walker    [] Wheelchair [] Wheelchair       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     Location RLE>LLE knee inferior to patella    Pain Rating currently 4/10   Pain at worse 10/10   Pain at best 0/10   Description of pain Ache, sharp   Altered Sensation Tingling in feet when she sits too long    What makes it worse She feels it swells if she walks/hikes all day, works out 6 days in a row    Sitting    What makes it better Extended and elevated      Normal LE exercises:  Hip thrust  Squats   Lunges  Reverse lunge  Leg press      Objective:    STRENGTH    Left Right   Hip Flex 5 5   Ext 4- 4-   ABD 4- 4-   ADD 5 5   Knee Flex 5 4+   Ext 5 4+         ROM  ° A/P    Left Right   Hip     __     Ext 20 20   ER 45 45   IR 25 20                           TESTS (+/-) Left Right Not Tested   GEMINIs - - []   Pat-Fem Grind + + []       OBSERVATION No Deficit Deficit Not Tested Comments   Posture       Forward Head [] [x] []    Rounded Shoulders [] [x] []    Genu Valgus [] [] []    Genu Varus [] [x] [] Standing toe out, genu varus position bilat knees    Slumped Sitting [] [x] []    Patellar Mobility [] [x] [] Limited bilat all planes    Gait [] [x] [] Analysis: Pt with decreased stance on RLE         FUNCTION Normal Difficult Unable   Sitting [] [x] []   Standing [] [x] []   Ambulation [] [x] []   Lift/Carry [] [x] []   Stairs [] [x] []   Bending [] [x] []   Squat [] [x] []         BALANCE/PROPRIOCEPTION                 [] Not tested   Single Leg Stance Right Left  Pain   Eyes Open  10 seconds  10 seconds []    Eyes Closed  5 seconds  5 seconds []           FUNCTIONAL TESTS PAIN NO PAIN COMMENTS   Step Test 6 [x] [] Toe out, hip add with genu valgus bilat  Pain with above position in the knee.      Corrected stance to neutral, repeated tap down and no pain in the knee    Squat [x] [] Stands with wide ROBI, toe out with genu valgus           Flexibility Normal Left tight Right tight   Hip flexor [] [x] [x]   HS [] [x] [x]   gastroc [] [x] [x]   piriformis [] [x] [x]    [] [] []    [] [] []      Somatic Dysfunctions Normal Deficit Details Treatment  Position Range of force   Pelvis   [x] []  [] MET   [] MOB   [] Manipulation [] Supine  [] Prone  [] Seated  [] Beginning  [] Middle  [] End-point    Lumbar [x] []  [] MET   [] MOB   [] Manipulation [] Supine  [] Prone  [] Seated  [] Beginning  [] Middle  [] End-point    SI   [x] []  [] MET   [] MOB   [] Manipulation [] Supine  [] Prone  [] Seated  [] Beginning  [] Middle  [] End-point    FRS = flexed, rotated, side-bent  ERS = Extended, rotated, side-bent   NS = Neutral Spine MET = Muscle Energy Technique  MOB = Mobilization   Manip = HVLA Manipulation         Functional Test: LEFS Score: 44% functionally impaired       Assessment:    Patient would benefit from skilled physical therapy services due to the gluteal weakness and muscle imbalances she currently displays. She has lower crossed symptoms that are contributing to her pain. Patient stands in a position that causes toe out/genu varus stance and squat/tap down in a genu valgus/hip add position. Educated patient in proper stance, avoidance of exercises and training that causes her pain and learning how to assess for compensatory movement. Focus will be on hip and core strength, proper mobility and movement with her trunk. Will add running and olympic lifting assessment as she improves with pain symptoms. Problems:    [x] ? Pain  [x] ? ROM  [x] ? Strength  [x] ? Function        Goals  MET NOT MET ON-  GOING  Details   Date Addressed:       STG: To be met in 10 treatments           1. ? Pain: Decrease pain levels to 4/10 with ADLs []  []  []      2. ? ROM: Increase LE flexibility and AROM limitations throughout to [facility] t reduce difficulty with ADLs []  []  []      3. ? Strength: Increase Bilat LE MMT to 5/5 throughout to ease functional limitations and mobility  []  []  []     4. Independent with Home Exercise Programs []  []  []     5. Patient to perform 10 reps in 10 seconds on 6 inch lateral tap downs to show improved eccentric control and strength  []  []  []      []  []  []     Date Addressed:        LTG: To be met in 20 treatments       1. Improve score on assessment tool LEFS from 44% impairment to less than 20% impairment  []  []  []     2. Reduce pain levels to 2/10 or less with ADLs []  []  []     3.  Patient to return to gym program with no pain or restrictions  []  []  []                Patient goals: eliminate knee pain     Rehab Potential:  [x] Good  [] Fair  [] Poor   Suggested Professional Referral:  [x] No  [] Yes:  Barriers to Goal Achievement[de-identified]  [x] No  [] Yes:  Domestic Concerns:  [x] No  [] Yes:    Pt. Education:  [x] Plans/Goals, Risks/Benefits discussed  [x] Home exercise program    Method of Education: [x] Verbal  [x] Demo  [x] Written  Comprehension of Education:  [x] Verbalizes understanding. [x] Demonstrates understanding. [x] Needs Review. [] Demonstrates/verbalizes understanding of HEP/Ed previously given. Treatment Plan:  [] Therapeutic Exercise    [] Aquatic Therapy   [] Manual Therapy     [] Electrical Stimulation  [] Instruction in HEP      [] Lumbar/Cervical Traction  [] Neuromuscular Re-education [] Cold/hotpack  [] Iontophoresis: 4 mg/mL  [] Vasocompression (GameReady)                    Dexamethasone Sodium  [] Gait Training             Phosphate 40-80 mAmin         []  Medication allergies reviewed for use of    Dexamethasone Sodium Phosphate 4mg/ml     with iontophoresis treatments. Pt is not allergic. Frequency:  2 x/week for 20 visits      Todays Treatment:    Access Code: [facility]  URL: [email or web address]  Date: 01/05/2023  Prepared by: Lizbet Rodriguez    Exercises  Hooklying Hamstring Stretch with Strap - 1 x daily - 7 x weekly - 3 sets - 30 (sec) hold  Clamshell - 1 x daily - 7 x weekly - 3 sets - 10 reps  Sidelying Hip Abduction - 1 x daily - 7 x weekly - 3 sets - 10 reps  Prone Hip Extension with Bent Knee - One Pillow - 1 x daily - 7 x weekly - 3 sets - 10 reps  Supine Bridge - 1 x daily - 7 x weekly - 3 sets - 10 reps      Exercise    Bilat Knee PFS Reps/ Time Weight/ Level Comments         Bike            SB calf stretch      HS standing stretch            Balance board      4 way hip bilat      SLS Rebounder      SLS Cones            Mat:      Bridges toes up      Clamshells      Hip Abd side      Prone hip ext pillow      Flying squirrel      Quad hip Abd      Quad Hip ext            Monster walk lateral      Total Gym squat band      Total Gym Heel raises                       Specific Instructions for next treatment: Manual as needed for calf/hip flexor tightness Evaluation Complexity:  History (Personal factors, comorbidities) [x] 0 [] 1-2 [] 3+   Exam (limitations, restrictions) [x] 1-2 [] 3 [] 4+   Clinical presentation (progression) [x] Stable [] Evolving  [] Unstable   Decision Making [x] Low [] Moderate [] High    [x] Low Complexity [] Moderate Complexity [] High Complexity       Treatment Charges: Mins Units   [x] Evaluation       [x]  Low       []  Moderate       []  High 35 1   [x]  Ther Exercise 15 1   []  Manual Therapy     []  Vasocompression     []  Gait     []        TOTAL TREATMENT TIME: 50    Time in:1700   Time Out:1750      Electronically signed by: Catherine Osullivan PT        Physician Signature:________________________________Date:__________________  By signing above or cosigning this note, I have reviewed this plan of care and certify a need for medically necessary rehabilitation services.      *PLEASE SIGN ABOVE AND FAX BACK ALL PAGES*

## 2023-01-10 ENCOUNTER — HOSPITAL ENCOUNTER (OUTPATIENT)
Dept: PHYSICAL THERAPY | Facility: CLINIC | Age: 28
Setting detail: THERAPIES SERIES
Discharge: HOME | End: 2023-01-10
Payer: COMMERCIAL

## 2023-01-10 PROCEDURE — 97110 THERAPEUTIC EXERCISES: CPT

## 2023-01-10 NOTE — FLOWSHEET NOTE
[x] [facility]  Outpatient Rehabilitation &  Therapy  [facility]   [place]: [phone]  F: [phone]      Physical Therapy Daily Treatment Note    Date:  1/10/2023  Patient Name:  Mitchel Anna    :  1995  MRN: [id number]  Physician: Dr Gerda Boydtingham: Christopher Bunch, 30 visits   Medical Diagnosis: Bilat PFP           Rehab Codes: M22.2X1, M22.2X2, M62.81 (Muscle Weakness), M62.9 (Disorder of Muscle), M79.1 (Myalgia), Z73.6 (Limitation of ADLs)   Onset date:                             Next 's appt.: 3-2023  Visit# / total visits:    Cancels/No Shows: 0/0    Subjective:    Pain:  [x] Yes  [] No Location: B knees Pain Rating: (0-10 scale) 7/10  Pain altered Tx:  [x] No  [] Yes  Action:  Comments: Patient arrives from the gym stating pain in both knees \"I feel like I have 79year old knees. \"     Objective:  Exercise     Bilat Knee PFS Reps/ Time Weight/ Level Comments             Bike  held                 SB calf stretch  3x30\"       HS standing stretch  3x30\"                 Balance board  5' L2     4 way hip bilat  x15 [place]     SLS Cones  next                  Mat:         Bridges toes up  2x10    HEP   Clamshells  3x10 [place]  HEP   Hip Abd side  2x10 Active   HEP   Prone hip ext pillow  2x10 bilat  HEP   Flying squirrel  next       Quad hip Abd  next       Quad Hip ext  next                 Monster walk lateral  2L Orange  band above knees   Total Gym squat band  next       Total Gym Heel raises   next                                 Specific Instructions for next treatment: Manual as needed for calf/hip flexor tightness          Treatment Charges: Mins Units   []  Modalities     [x]  Ther Exercise 40 3   []  Manual Therapy     []  Ther Activities     []  Aquatics     []  Vasocompression     []  Other     Total Treatment time 40 3       Assessment: [x] Progressing toward goals. Held warm-up due to patient arriving from workout at the gym. Quick fatigue noted from patient with all exercises this date. Administered orange band to add to clamshells and begin monster walks. Educated patient to focus on all body weight exercises due to patient performing weight exercises in the gym resulting in pain. Will continue to advance hip/glute strength next visit. [] No change. [] Other:  [x] Patient would benefit from skilled physical therapy services due to the gluteal weakness and muscle imbalances she currently displays. She has lower crossed symptoms that are contributing to her pain. Patient stands in a position that causes toe out/genu varus stance and squat/tap down in a genu valgus/hip add position. Educated patient in proper stance, avoidance of exercises and training that causes her pain and learning how to assess for compensatory movement. Focus will be on hip and core strength, proper mobility and movement with her trunk. Will add running and olympic lifting assessment as she improves with pain symptoms. STG/LTG  Goals  MET NOT MET ON-  GOING  Details   Date Addressed:           STG: To be met in 10 treatments            1. ? Pain: Decrease pain levels to 4/10 with ADLs []  []  []      2. ? ROM: Increase LE flexibility and AROM limitations throughout to [facility] t reduce difficulty with ADLs []  []  []      3. ? Strength: Increase Bilat LE MMT to 5/5 throughout to ease functional limitations and mobility  []  []  []      4. Independent with Home Exercise Programs []  []  []      5. Patient to perform 10 reps in 10 seconds on 6 inch lateral tap downs to show improved eccentric control and strength  []  []  []        []  []  []      Date Addressed:            LTG: To be met in 20 treatments           1. Improve score on assessment tool LEFS from 44% impairment to less than 20% impairment  []  []  []      2. Reduce pain levels to 2/10 or less with ADLs []  []  []      3.  Patient to return to gym program with no pain or restrictions  []  []  [] Patient goals: eliminate knee pain      Rehab Potential:  [x] Good  [] Fair  [] Poor    Suggested Professional Referral:  [x] No  [] Yes:  Barriers to Goal Achievement[de-identified]  [x] No  [] Yes:  Domestic Concerns:  [x] No  [] Yes:       Pt. Education:  [x] Yes  [] No  [] Reviewed Prior HEP/Ed  Method of Education: [x] Verbal  [] Demo  [] Written  Comprehension of Education:  [] Verbalizes understanding. [] Demonstrates understanding. [x] Needs review. [] Demonstrates/verbalizes HEP/Ed previously given. Plan: [x] Continue current frequency toward long and short term goals.           Time In: 5:00pm            Time Out: 5:40pm    Electronically signed by:  Sushant Rosenbaum PTA

## 2023-01-12 ENCOUNTER — HOSPITAL ENCOUNTER (OUTPATIENT)
Dept: PHYSICAL THERAPY | Facility: CLINIC | Age: 28
Setting detail: THERAPIES SERIES
Discharge: HOME | End: 2023-01-12
Payer: COMMERCIAL

## 2023-01-12 PROCEDURE — 97110 THERAPEUTIC EXERCISES: CPT

## 2023-01-12 NOTE — FLOWSHEET NOTE
[x] [facility]  Outpatient Rehabilitation &  Therapy  [facility]   [place]: [phone]  F: [phone]      Physical Therapy Daily Treatment Note    Date:  2023  Patient Name:  Ousmane Maddox    :  1995  MRN: [id number]  Physician: Dr Wilfred Darnell: [facility], 30 visits   Medical Diagnosis: Bilat PFP           Rehab Codes: M22.2X1, M22.2X2, M62.81 (Muscle Weakness), M62.9 (Disorder of Muscle), M79.1 (Myalgia), Z73.6 (Limitation of ADLs)   Onset date:                             Next 's appt.: 3-2023  Visit# / total visits: 3/20   Cancels/No Shows: 0/0    Subjective:    Pain:  [x] Yes  [] No Location: B knees Pain Rating: (0-10 scale) 0/10  Pain altered Tx:  [x] No  [] Yes  Action:  Comments: Patient arrives stating soreness in knees and some irritation near inferior patella. Objective:  Exercise     Bilat Knee PFS Reps/ Time Weight/ Level Comments             Bike  held                 SB calf stretch  3x30\"       HS standing stretch  3x30\"                 Balance board  5' L2     4 way hip bilat  x15 Orange     SLS Cones  x1 bilat               Mat:         Bridges toes up  2x10 Maynard  HEP   Clamshells  2x10 Maynard  HEP   Hip Abd side  2x10 Maynard  HEP   Prone hip ext pillow  2x10 bilat  HEP   Flying squirrel  next       Quad hip Abd  x10       Quad Hip ext  x10                 Monster walk lateral  2L Orange  band above knees   Total Gym squat band  2x10 L20     Total Gym Heel raises   2x10 L20                               Specific Instructions for next treatment: Manual as needed for calf/hip flexor tightness        Treatment Charges: Mins Units   []  Modalities     [x]  Ther Exercise 50 3   []  Manual Therapy     []  Ther Activities     []  Aquatics     []  Vasocompression     []  Other     Total Treatment time 50 3       Assessment: [x] Progressing toward goals. Initiated with warm-up, followed by stretches.  Continued with LE glute and hip strengthening with patient noting significant fatigue. Added core exercises to program with cues given to maintain form. No pain in knees throughout program. Stressed importance of continuing HEP. Will continue to progress LE strength next visit. [] No change. [] Other:  [x] Patient would benefit from skilled physical therapy services due to the gluteal weakness and muscle imbalances she currently displays. She has lower crossed symptoms that are contributing to her pain. Patient stands in a position that causes toe out/genu varus stance and squat/tap down in a genu valgus/hip add position. Educated patient in proper stance, avoidance of exercises and training that causes her pain and learning how to assess for compensatory movement. Focus will be on hip and core strength, proper mobility and movement with her trunk. Will add running and olympic lifting assessment as she improves with pain symptoms. STG/LTG  Goals  MET NOT MET ON-  GOING  Details   Date Addressed:           STG: To be met in 10 treatments            1. ? Pain: Decrease pain levels to 4/10 with ADLs []  []  []      2. ? ROM: Increase LE flexibility and AROM limitations throughout to [facility] t reduce difficulty with ADLs []  []  []      3. ? Strength: Increase Bilat LE MMT to 5/5 throughout to ease functional limitations and mobility  []  []  []      4. Independent with Home Exercise Programs []  []  []      5. Patient to perform 10 reps in 10 seconds on 6 inch lateral tap downs to show improved eccentric control and strength  []  []  []        []  []  []      Date Addressed:            LTG: To be met in 20 treatments           1. Improve score on assessment tool LEFS from 44% impairment to less than 20% impairment  []  []  []      2. Reduce pain levels to 2/10 or less with ADLs []  []  []      3.  Patient to return to gym program with no pain or restrictions  []  []  []                        Patient goals: eliminate knee pain Rehab Potential:  [x] Good  [] Fair  [] Poor    Suggested Professional Referral:  [x] No  [] Yes:  Barriers to Goal Achievement[de-identified]  [x] No  [] Yes:  Domestic Concerns:  [x] No  [] Yes:       Pt. Education:  [x] Yes  [] No  [] Reviewed Prior HEP/Ed  Method of Education: [x] Verbal  [] Demo  [] Written  Comprehension of Education:  [] Verbalizes understanding. [] Demonstrates understanding. [] Needs review. [x] Demonstrates/verbalizes HEP/Ed previously given. Plan: [x] Continue current frequency toward long and short term goals.           Time In: 4:30pm            Time Out: 5:30pm    Electronically signed by:  Chica Santiago PTA

## 2023-01-17 ENCOUNTER — HOSPITAL ENCOUNTER (OUTPATIENT)
Dept: PHYSICAL THERAPY | Facility: CLINIC | Age: 28
Setting detail: THERAPIES SERIES
Discharge: HOME | End: 2023-01-17
Payer: COMMERCIAL

## 2023-01-17 NOTE — CARE COORDINATION
[] [address]  [address]    [phone]  F: [phone]   [] [address]  [facility] 36   [address]  P: [phone]  F: [phone]  [] [address]  [address] [facility]  P: [phone]  F: [phone] [] [address]: [phone]  F: [phone]  [x] [address]  [address] [address] [facility] [address]   [address]   [place]: [phone]  F: [phone]   [] [address]  [place]: [phone]   F: [phone]     Physical Therapy Cancel/No Show note    Date: 2023  Patient: Yfn Gupta  : 1995  MRN: [id number]    Cancels/No Shows to date:      For today's appointment patient:    [x]  Cancelled    [] Rescheduled appointment    [] No-show     Reason given by patient:    [x]  Patient ill    []  Conflicting appointment    [] No transportation      [] Conflict with work    [] No reason given    [] Weather related    [] [id number]    [] Other:      Comments:        [] Next appointment was confirmed    Electronically signed by: Nicol Green PTA

## 2023-01-19 ENCOUNTER — HOSPITAL ENCOUNTER (OUTPATIENT)
Dept: PHYSICAL THERAPY | Facility: CLINIC | Age: 28
Setting detail: THERAPIES SERIES
Discharge: HOME | End: 2023-01-19
Payer: COMMERCIAL

## 2023-01-19 NOTE — CARE COORDINATION
[] [address]  [address]    [phone]  F: [phone]   [] [address]  [facility] 36   [address]  P: [phone]  F: [phone]  [] [address]  [address] [facility]  P: [phone]  F: [phone] [] [facility] [address]: [phone]  F: [phone]  [x] [address]  [address] [address] [facility] 70   [address]   [place]: [phone]  F: [phone]   [] [place] [address] [facility] [address]  [place]: [phone]   F: [phone]     Physical Therapy Cancel/No Show note    Date: 2023  Patient: Jamie Real  : 1995  MRN: [id number]    Cancels/No Shows to date:      For today's appointment patient:    []  Cancelled    [] Rescheduled appointment    [x] No-show     Reason given by patient:    []  Patient ill    []  Conflicting appointment    [] No transportation      [] Conflict with work    [] No reason given    [] Weather related    [] COVID-19    [] Other:      Comments:        [] Next appointment was confirmed    Electronically signed by: Sha Boo PTA

## 2023-01-26 ENCOUNTER — HOSPITAL ENCOUNTER (OUTPATIENT)
Dept: PHYSICAL THERAPY | Facility: CLINIC | Age: 28
Setting detail: THERAPIES SERIES
Discharge: HOME | End: 2023-01-26
Payer: COMMERCIAL

## 2023-01-26 NOTE — CARE COORDINATION
[] [address]  [address]    [phone]  F: [phone]   [] [address]  [facility] 36   [address]  P: [phone]  F: [phone]  [] [address]  [address] [facility]  P: [phone]  F: [phone] [] [facility] [address]: [phone]  F: [phone]  [x] [address]  [address] [address] [facility] 70   [address]   [place]: [phone]  F: [phone]   [] [address]  [place]: [phone]   F: [phone]     Physical Therapy Cancel/No Show note    Date: 2023  Patient: Ana Velasquez  : 1995  MRN: [id number]    Cancels/No Shows to date:     For today's appointment patient:    []  Cancelled    [] Rescheduled appointment    [x] No-show     Reason given by patient:    []  Patient ill    []  Conflicting appointment    [] No transportation      [] Conflict with work    [] No reason given    [] Weather related    [] COVID-19    [x] Other:      Comments:  no call, no show.        [] Next appointment was confirmed    Electronically signed by: Shaq Waddell PTA

## 2023-02-02 ENCOUNTER — HOSPITAL ENCOUNTER (OUTPATIENT)
Dept: PHYSICAL THERAPY | Facility: CLINIC | Age: 28
Setting detail: THERAPIES SERIES
Discharge: HOME | End: 2023-02-02

## 2023-02-02 NOTE — CARE COORDINATION
[] [address]  [address]    [phone]  F: [phone]   [] [address] [facility] 36   [address]  P: [phone]  F: [phone]  [] [address]  [address] [facility]  P: [phone]  F: [phone] [] [address]: [phone]  F: [phone]  [x] [address]  [address] [address] [facility] 70   [address]   [facility]: [phone]  F: [phone]   [] [place] [address] [facility] [address]  [facility]: [phone]   F: [phone]     Physical Therapy Cancel/No Show note    Date: 2023  Patient: Hamlet Meneses  : 1995  MRN: [id number]    Cancels/No Shows to date:     For today's appointment patient:    []  Cancelled    [] Rescheduled appointment    [x] No-show     Reason given by patient:    []  Patient ill    []  Conflicting appointment    [] No transportation      [] Conflict with work    [] No reason given    [] Weather related    [] [id number]    [x] Other:      Comments: Will discharge at this time due to No Show policy.        [] Next appointment was confirmed    Electronically signed by: Melania Lundberg PTA

## 2023-02-03 NOTE — DISCHARGE SUMMARY
[x] [facility]  Outpatient Rehabilitation &  Therapy  [facility]   [place]: [phone]  F: [phone]         Physical Therapy Discharge Note    Date: 2/3/2023      Physician: Dr Cochran Solid: Antonia Saab, 30 visits   Medical Diagnosis: Bilat PFP           Rehab Codes: M22.2X1, M22.2X2, M62.81 (Muscle Weakness), M62.9 (Disorder of Muscle), M79.1 (Myalgia), Z73.6 (Limitation of ADLs)   Onset date: 9-2022                            Next 's appt.: 3-2023  Visit# / total visits: 3/20          Cancels/No Shows: 0/0       Subjective:  Refer to initial evaluation. Objective:  Refer to initial evaluation. Assessment:  Refer to initial evaluation. Treatment to Date:  [x] Therapeutic Exercise    [] Modalities:  [] Therapeutic Activity    [] Ultrasound  [] Electrical Stimulation  [] Gait Training     [] Massage       [] Lumbar/Cervical Traction  [] Neuromuscular Re-education [] Cold/hotpack [] Iontophoresis: 4 mg/mL  [x] Instruction in Home Exercise Program                     Dexamethasone Sodium  [] Manual Therapy             Phosphate 40-80 mAmin  [] Aquatic Therapy                   [] Vasocompression/    [] Other:             Game Ready    Discharge Status:     [] Pt to continue exercise/home instructions independently. [] Therapy interrupted due to:    [x] Pt has 2 or more no shows/cancels, is discontinued per our policy. [] Patient did not return following last scheduled visit. [] Patient did not return after evaluation. [] Other:       Electronically signed by Tati Monroe PT on 2/3/2023 at 10:09 AM      If you have any questions or concerns, please don't hesitate to call.   Thank you for your referral.

## 2023-03-26 ENCOUNTER — HOSPITAL ENCOUNTER (EMERGENCY)
Facility: CLINIC | Age: 28
Discharge: HOME | End: 2023-03-26
Attending: EMERGENCY MEDICINE
Payer: COMMERCIAL

## 2023-03-26 ENCOUNTER — APPOINTMENT (OUTPATIENT)
Dept: CT IMAGING | Facility: CLINIC | Age: 28
End: 2023-03-26
Payer: COMMERCIAL

## 2023-03-26 VITALS
DIASTOLIC BLOOD PRESSURE: 70 MMHG | TEMPERATURE: 98.5 F | HEIGHT: 65 IN | WEIGHT: 170 LBS | RESPIRATION RATE: 16 BRPM | OXYGEN SATURATION: 99 % | SYSTOLIC BLOOD PRESSURE: 133 MMHG | HEART RATE: 79 BPM | BODY MASS INDEX: 28.32 KG/M2

## 2023-03-26 DIAGNOSIS — R10.2 PELVIC PAIN IN FEMALE: ICD-10-CM

## 2023-03-26 DIAGNOSIS — B96.89 BACTERIAL VAGINITIS: Primary | ICD-10-CM

## 2023-03-26 DIAGNOSIS — N76.0 BACTERIAL VAGINITIS: Primary | ICD-10-CM

## 2023-03-26 LAB
ABSOLUTE EOS #: 0.1 K/UL (ref 0–0.4)
ABSOLUTE LYMPH #: 2 K/UL (ref 1–4.8)
ABSOLUTE MONO #: 0.7 K/UL (ref 0.1–1.2)
ALBUMIN SERPL-MCNC: 4.4 G/DL (ref 3.5–5.2)
ALBUMIN/GLOBULIN RATIO: 1.5 (ref 1–2.5)
ALP SERPL-CCNC: 66 U/L (ref 35–104)
ALT SERPL-CCNC: 13 U/L (ref 5–33)
ANION GAP SERPL CALCULATED.3IONS-SCNC: 9 MMOL/L (ref 9–17)
AST SERPL-CCNC: 17 U/L
BACTERIA: ABNORMAL
BASOPHILS # BLD: 0 % (ref 0–2)
BASOPHILS ABSOLUTE: 0.1 K/UL (ref 0–0.2)
BILIRUB SERPL-MCNC: 0.2 MG/DL (ref 0.3–1.2)
BILIRUBIN URINE: NEGATIVE
BUN SERPL-MCNC: 8 MG/DL (ref 6–20)
CALCIUM SERPL-MCNC: 9.3 MG/DL (ref 8.6–10.4)
CANDIDA SPECIES, DNA PROBE: NEGATIVE
CHLORIDE SERPL-SCNC: 105 MMOL/L (ref 98–107)
CO2 SERPL-SCNC: 25 MMOL/L (ref 20–31)
COLOR: YELLOW
CREAT SERPL-MCNC: 0.6 MG/DL (ref 0.5–0.9)
EOSINOPHILS RELATIVE PERCENT: 1 % (ref 1–4)
EPITHELIAL CELLS UA: ABNORMAL /HPF (ref 0–5)
GARDNERELLA VAGINALIS, DNA PROBE: POSITIVE
GFR SERPL CREATININE-BSD FRML MDRD: >60 ML/MIN/1.73M2
GLUCOSE SERPL-MCNC: 100 MG/DL (ref 70–99)
GLUCOSE UR STRIP.AUTO-MCNC: NEGATIVE MG/DL
HCG(URINE) PREGNANCY TEST: NEGATIVE
HCT VFR BLD AUTO: 35.6 % (ref 36–46)
HGB BLD-MCNC: 12.2 G/DL (ref 12–16)
KETONES UR STRIP.AUTO-MCNC: NEGATIVE MG/DL
LEUKOCYTE ESTERASE UR QL STRIP.AUTO: ABNORMAL
LYMPHOCYTES # BLD: 15 % (ref 24–44)
MCH RBC QN AUTO: 29.6 PG (ref 26–34)
MCHC RBC AUTO-ENTMCNC: 34.2 G/DL (ref 31–37)
MCV RBC AUTO: 86.6 FL (ref 80–100)
MONOCYTES # BLD: 6 % (ref 2–11)
NITRITE UR QL STRIP.AUTO: NEGATIVE
OTHER OBSERVATIONS UA: ABNORMAL
PDW BLD-RTO: 13.6 % (ref 12.5–15.4)
PLATELET # BLD AUTO: 331 K/UL (ref 140–450)
PMV BLD AUTO: 7.6 FL (ref 6–12)
POTASSIUM SERPL-SCNC: 3.9 MMOL/L (ref 3.7–5.3)
PROT SERPL-MCNC: 7.3 G/DL (ref 6.4–8.3)
PROT UR STRIP.AUTO-MCNC: 7.5 MG/DL (ref 5–8)
PROT UR STRIP.AUTO-MCNC: NEGATIVE MG/DL
RBC # BLD: 4.11 M/UL (ref 4–5.2)
RBC CLUMPS #/AREA URNS AUTO: ABNORMAL /HPF (ref 0–2)
SEG NEUTROPHILS: 78 % (ref 36–66)
SEGMENTED NEUTROPHILS ABSOLUTE COUNT: 10.5 K/UL (ref 1.8–7.7)
SODIUM SERPL-SCNC: 139 MMOL/L (ref 135–144)
SOURCE: ABNORMAL
SPECIFIC GRAVITY UA: 1.01 (ref 1–1.03)
TRICHOMONAS VAGINALIS DNA: NEGATIVE
TURBIDITY: CLEAR
URINE HGB: NEGATIVE
UROBILINOGEN, URINE: NORMAL
WBC # BLD AUTO: 13.5 K/UL (ref 3.5–11)
WBC UA: ABNORMAL /HPF (ref 0–5)

## 2023-03-26 PROCEDURE — 81025 URINE PREGNANCY TEST: CPT

## 2023-03-26 PROCEDURE — 2580000003 HC RX 258: Performed by: NURSE PRACTITIONER

## 2023-03-26 PROCEDURE — 6360000002 HC RX W HCPCS: Performed by: NURSE PRACTITIONER

## 2023-03-26 PROCEDURE — 96365 THER/PROPH/DIAG IV INF INIT: CPT

## 2023-03-26 PROCEDURE — 2580000003 HC RX 258: Performed by: EMERGENCY MEDICINE

## 2023-03-26 PROCEDURE — 87510 GARDNER VAG DNA DIR PROBE: CPT

## 2023-03-26 PROCEDURE — 87491 CHLMYD TRACH DNA AMP PROBE: CPT

## 2023-03-26 PROCEDURE — 96375 TX/PRO/DX INJ NEW DRUG ADDON: CPT

## 2023-03-26 PROCEDURE — 85025 COMPLETE CBC W/AUTO DIFF WBC: CPT

## 2023-03-26 PROCEDURE — 99285 EMERGENCY DEPT VISIT HI MDM: CPT

## 2023-03-26 PROCEDURE — 36415 COLL VENOUS BLD VENIPUNCTURE: CPT

## 2023-03-26 PROCEDURE — 6360000004 HC RX CONTRAST MEDICATION: Performed by: EMERGENCY MEDICINE

## 2023-03-26 PROCEDURE — 87480 CANDIDA DNA DIR PROBE: CPT

## 2023-03-26 PROCEDURE — 81001 URINALYSIS AUTO W/SCOPE: CPT

## 2023-03-26 PROCEDURE — 87591 N.GONORRHOEAE DNA AMP PROB: CPT

## 2023-03-26 PROCEDURE — 80053 COMPREHEN METABOLIC PANEL: CPT

## 2023-03-26 PROCEDURE — 74177 CT ABD & PELVIS W/CONTRAST: CPT

## 2023-03-26 PROCEDURE — 87660 TRICHOMONAS VAGIN DIR PROBE: CPT

## 2023-03-26 RX ORDER — DOXYCYCLINE HYCLATE 100 MG
100 TABLET ORAL 2 TIMES DAILY
Qty: 28 TABLET | Refills: 0 | Status: SHIPPED | OUTPATIENT
Start: 2023-03-26 | End: 2023-04-09

## 2023-03-26 RX ORDER — SODIUM CHLORIDE 0.9 % (FLUSH) 0.9 %
10 SYRINGE (ML) INJECTION PRN
Status: DISCONTINUED | OUTPATIENT
Start: 2023-03-26 | End: 2023-03-26 | Stop reason: HOSPADM

## 2023-03-26 RX ORDER — 0.9 % SODIUM CHLORIDE 0.9 %
70 INTRAVENOUS SOLUTION INTRAVENOUS ONCE
Status: COMPLETED | OUTPATIENT
Start: 2023-03-26 | End: 2023-03-26

## 2023-03-26 RX ORDER — KETOROLAC TROMETHAMINE 15 MG/ML
15 INJECTION, SOLUTION INTRAMUSCULAR; INTRAVENOUS ONCE
Status: COMPLETED | OUTPATIENT
Start: 2023-03-26 | End: 2023-03-26

## 2023-03-26 RX ORDER — 0.9 % SODIUM CHLORIDE 0.9 %
1000 INTRAVENOUS SOLUTION INTRAVENOUS ONCE
Status: COMPLETED | OUTPATIENT
Start: 2023-03-26 | End: 2023-03-26

## 2023-03-26 RX ORDER — METRONIDAZOLE 500 MG/1
500 TABLET ORAL 2 TIMES DAILY
Qty: 28 TABLET | Refills: 0 | Status: SHIPPED | OUTPATIENT
Start: 2023-03-26 | End: 2023-04-09

## 2023-03-26 RX ADMIN — SODIUM CHLORIDE, PRESERVATIVE FREE 10 ML: 5 INJECTION INTRAVENOUS at 12:14

## 2023-03-26 RX ADMIN — SODIUM CHLORIDE 70 ML: 9 INJECTION, SOLUTION INTRAVENOUS at 12:09

## 2023-03-26 RX ADMIN — KETOROLAC TROMETHAMINE 15 MG: 15 INJECTION, SOLUTION INTRAMUSCULAR; INTRAVENOUS at 11:20

## 2023-03-26 RX ADMIN — CEFTRIAXONE SODIUM 1000 MG: 1 INJECTION, POWDER, FOR SOLUTION INTRAMUSCULAR; INTRAVENOUS at 12:17

## 2023-03-26 RX ADMIN — SODIUM CHLORIDE 1000 ML: 9 INJECTION, SOLUTION INTRAVENOUS at 11:20

## 2023-03-26 RX ADMIN — IOPAMIDOL 75 ML: 755 INJECTION, SOLUTION INTRAVENOUS at 12:14

## 2023-03-26 ASSESSMENT — PAIN DESCRIPTION - FREQUENCY: FREQUENCY: CONTINUOUS

## 2023-03-26 ASSESSMENT — PAIN SCALES - GENERAL
PAINLEVEL_OUTOF10: 7
PAINLEVEL_OUTOF10: 4

## 2023-03-26 ASSESSMENT — PAIN - FUNCTIONAL ASSESSMENT
PAIN_FUNCTIONAL_ASSESSMENT: 0-10
PAIN_FUNCTIONAL_ASSESSMENT: ACTIVITIES ARE NOT PREVENTED

## 2023-03-26 ASSESSMENT — PAIN DESCRIPTION - LOCATION
LOCATION: ABDOMEN;PELVIS
LOCATION: ABDOMEN;PELVIS

## 2023-03-26 ASSESSMENT — PAIN DESCRIPTION - ORIENTATION
ORIENTATION: RIGHT;LEFT
ORIENTATION: RIGHT;LEFT

## 2023-03-26 ASSESSMENT — PAIN DESCRIPTION - ONSET: ONSET: ON-GOING

## 2023-03-26 ASSESSMENT — PAIN DESCRIPTION - DESCRIPTORS
DESCRIPTORS: ACHING;CRAMPING
DESCRIPTORS: ACHING

## 2023-03-26 ASSESSMENT — PAIN DESCRIPTION - PAIN TYPE: TYPE: ACUTE PAIN

## 2023-03-26 NOTE — ED NOTES
Blood work, urine, and vaginal swabs collected, labeled, and sent to lab     Molly Watkins, FRED  03/26/23 1612

## 2023-03-26 NOTE — ED NOTES
Dr. Janean Buerger on-call hours paged for consult  Awaiting call back at this time     Edi Montano, RN  03/26/23 1034

## 2023-03-26 NOTE — ED PROVIDER NOTES
tablet Take 1 tablet by mouth nightly as needed for Sleep, Disp-30 tablet,R-0Normal      Norgestimate-Eth Estradiol (SPRINTEC 28 PO) Take by mouthHistorical Med             ALLERGIES     Latex, Nyquil hbp cold & flu  [dm-doxylamine-acetaminophen], and Oseltamivir    FAMILY HISTORY       Family History   Problem Relation Age of Onset    Diabetes Paternal Grandmother     Cancer Maternal Grandmother           SOCIAL HISTORY       Social History     Socioeconomic History    Marital status: Single     Spouse name: None    Number of children: None    Years of education: None    Highest education level: None   Tobacco Use    Smoking status: Former    Smokeless tobacco: Never    Tobacco comments:     was only an occas smoker   Vaping Use    Vaping Use: Every day   Substance and Sexual Activity    Alcohol use: Not Currently     Alcohol/week: 5.0 standard drinks     Types: 5 Cans of beer per week     Comment: every other week    Drug use: No    Sexual activity: Yes     Partners: Male       SCREENINGS         Piyush Coma Scale  Eye Opening: Spontaneous  Best Verbal Response: Oriented  Best Motor Response: Obeys commands  Fowler Coma Scale Score: 15                     CIWA Assessment  BP: 133/70  Heart Rate: 79                 PHYSICAL EXAM    (up to 7 for level 4, 8 or more for level 5)     ED Triage Vitals [03/26/23 1056]   BP Temp Temp Source Heart Rate Resp SpO2 Height Weight   133/70 98.5 °F (36.9 °C) Oral 79 16 99 % 5' 5\" (1.651 m) 170 lb (77.1 kg)       Physical Exam  Vitals and nursing note reviewed. Constitutional:       General: She is not in acute distress. Appearance: Normal appearance. She is not toxic-appearing. HENT:      Head: Normocephalic and atraumatic. Right Ear: External ear normal.      Left Ear: External ear normal.      Nose: Nose normal.      Mouth/Throat:      Mouth: Mucous membranes are moist.      Pharynx: Oropharynx is clear.    Eyes:      Extraocular Movements: Extraocular movements
Medical Condition (MA)   Reason for Exam: Pt. C/o suprapubic pelvic pain, lower abdominal and rectal   pain. Elevated WBC. FINDINGS:   Lower Chest: Unremarkable     Organs: The liver, pancreas and spleen appear normal.  The gallbladder is   contracted. The adrenal glands and kidneys appear normal.     GI/Bowel: The stomach, and small bowel loops appear unremarkable. The colon   appears normal.  The appendix was not visualized but there are no   inflammatory changes around the cecum. Pelvis: The bladder, a uterus, and rectum appear normal.     Peritoneum/Retroperitoneum: Unremarkable     Bones/Soft Tissues: Unremarkable                     PERTINENT ATTENDING PHYSICIAN COMMENTS:    The patient presents with pelvic pain abdominal discomfort and nausea. She says that over the past 24 hours she has noted some vaginal discharge and odor. The patient has not had a new partner in 3 years. She does have a history of herpes and has had an abnormal Pap. She has had prior colonoscopies but they did not find any polyps or any colon issues. She did have some ulcers on the EGD portion. The patient reports no fever. She is not having dysuria. On exam, the patient has mild suprapubic discomfort but no adnexal tenderness or fullness. The pelvic exam was performed by the NP. Please refer to her documentation. I was concerned about PID and STIs. CT was obtained. No obvious abscesses appreciated but the patient does have an elevated white count. She is positive for BV. We have been in touch with the OB/GYN who recommends Flagyl and doxycycline. She will be asked to follow-up with her OB/GYN in the next few days. She is discharged in good condition.          Jesu Santoyo MD  03/26/23 9818

## 2023-03-26 NOTE — DISCHARGE INSTRUCTIONS
Home.  Tylenol and Motrin as needed for pain. Antibiotics as prescribed until gone. We will call you with any positive culture results. You are to call your OB/GYN office tomorrow morning and they will see you in follow-up next week. Return to the ER for pain not controlled, fevers, nausea vomiting or any other concerns. Take 2 over-the-counter Tylenol with 3 over-the-counter Motrin every 8 hours to help with pain control. Please finish your antibiotics unless your OB/GYN directs you otherwise. Your test results will be on [facility] or we will call you with any positive results.

## 2023-03-26 NOTE — ED NOTES
Patient to ED via self to room 3  Here for complaint of abdominal pain in between  scar and pelvis  Patient states this has been ongoing for a few months with worsening symptoms over the last few days  Patient also states she has been feeling some bladder pressure when she has to use the restroom  Denies CP, SOB, or emesis but states she is nauseous    Vitals obtained and call light provided  Patient resting comfortably on stretcher in no apparent distress  Respirations even and non-labored  Provider at bedside for evaluation     Scooby Garces RN  23 1224

## 2023-03-27 LAB
C TRACH DNA SPEC QL PROBE+SIG AMP: NEGATIVE
N GONORRHOEA DNA SPEC QL PROBE+SIG AMP: NEGATIVE
SPECIMEN DESCRIPTION: NORMAL

## 2024-04-12 DIAGNOSIS — M22.2X1 PATELLOFEMORAL DISORDER OF BOTH KNEES: Primary | ICD-10-CM

## 2024-04-12 DIAGNOSIS — M22.2X2 PATELLOFEMORAL DISORDER OF BOTH KNEES: Primary | ICD-10-CM

## 2024-04-15 ENCOUNTER — OFFICE VISIT (OUTPATIENT)
Dept: ORTHOPEDIC SURGERY | Age: 29
End: 2024-04-15

## 2024-04-15 VITALS — HEIGHT: 65 IN | WEIGHT: 180 LBS | BODY MASS INDEX: 29.99 KG/M2 | RESPIRATION RATE: 17 BRPM

## 2024-04-15 DIAGNOSIS — M22.2X1 PATELLOFEMORAL PAIN SYNDROME OF BOTH KNEES: Primary | ICD-10-CM

## 2024-04-15 DIAGNOSIS — M22.2X2 PATELLOFEMORAL PAIN SYNDROME OF BOTH KNEES: Primary | ICD-10-CM

## 2024-04-15 PROCEDURE — 99204 OFFICE O/P NEW MOD 45 MIN: CPT | Performed by: PHYSICIAN ASSISTANT

## 2024-04-15 ASSESSMENT — ENCOUNTER SYMPTOMS
CHEST TIGHTNESS: 0
RESPIRATORY NEGATIVE: 1
ABDOMINAL PAIN: 0
ABDOMINAL DISTENTION: 0
COUGH: 0
APNEA: 0
CONSTIPATION: 0
VOMITING: 0
NAUSEA: 0
DIARRHEA: 0
GASTROINTESTINAL NEGATIVE: 1
COLOR CHANGE: 0
SHORTNESS OF BREATH: 0

## 2024-04-15 NOTE — PROGRESS NOTES
[facility] ORTHOPEDICS AND SPORTS MEDICINE  [address]  [facility] [place] [address]  Dept: [phone]  Dept Fax: [phone]          Bilateral Knee - New Patient     Subjective:     Chief Complaint   Patient presents with    Knee Pain     B Knee Pain     HPI:     Angelina Jean Baptiste presents today for Bilateral knee pain. The pain has been present for years. Occupation: addiction counseling/student nursing The patient recalls no specific injury. The patient has tried brace, heat, ice, physical therapy, nsaids with no improvement. The pain is now described as Stabbing  and Sharp. There is  pain on weight bearing. The knee has not swelled. There is  painful popping and clicking. The knee has caught or locked up. The knee has not given out. It is  stiff upon arising from sitting. It is  painful to go up and down stairs and sit for a prolonged time. The patient has not had a cortisone injection. The patient has not tried a lubrication injection. The patient has tried physical therapy for about 2 weeks and continues to work out. She is a power .  The patient has not had surgery.     ROS:   Review of Systems   Constitutional:  Positive for activity change. Negative for appetite change, fatigue and fever.   Respiratory: Negative.  Negative for apnea, cough, chest tightness and shortness of breath.    Cardiovascular: Negative.  Negative for chest pain, palpitations and leg swelling.   Gastrointestinal: Negative.  Negative for abdominal distention, abdominal pain, constipation, diarrhea, nausea and vomiting.   Genitourinary: Negative.  Negative for difficulty urinating, dysuria and hematuria.   Musculoskeletal:  Positive for arthralgias and gait problem. Negative for joint swelling and myalgias.   Skin: Negative.  Negative for color change and rash.   Neurological:  Negative for dizziness, weakness, numbness and headaches. 
normal

## 2024-04-15 NOTE — PATIENT INSTRUCTIONS
[facility]    Yuliana Rankin PA-C, ATC    Over the counter anti-inflammatory protocol (NSAIDS)    You may take the following over the counter medication for only 10-14 days to  reduce inflammation.    If you develop an upset stomach, diarrhea, heartburn/GERD symptoms   discontinue immediately.    If you need the medication on a long-term basis >greater than 10-14 days. Please contact your family doctor/PCP to discuss your options as you   will require ongoing medical monitoring.             Over the Counter/OTC             Ibuprofen/Advil/Motrin                                                                                                            200 mg tablets                  3-4 tables 3 times a day with food             OR            Naproxen Sodium, Aleve                    220 mg tablets          2 tablets 2 times a day with food           You May Add                           Tylenol extra strength, Acetaminophen           500 mg tablets               2 tablets every 8 hours    You may alternate with the NSAIDS for pain.   NO more than 3000 mg of Tylenol/acetaminophen in 24 hours. '  Look at any OTC medications for added  Tylenol/acetaminophen--cold/sinus/flu medication.

## 2025-03-22 ENCOUNTER — APPOINTMENT (OUTPATIENT)
Dept: GENERAL RADIOLOGY | Facility: CLINIC | Age: 30
End: 2025-03-22

## 2025-03-22 ENCOUNTER — HOSPITAL ENCOUNTER (EMERGENCY)
Facility: CLINIC | Age: 30
Discharge: HOME | End: 2025-03-22
Attending: SPECIALIST

## 2025-03-22 VITALS
SYSTOLIC BLOOD PRESSURE: 156 MMHG | TEMPERATURE: 98 F | WEIGHT: 192 LBS | HEIGHT: 65 IN | RESPIRATION RATE: 19 BRPM | HEART RATE: 90 BPM | BODY MASS INDEX: 31.99 KG/M2 | DIASTOLIC BLOOD PRESSURE: 81 MMHG | OXYGEN SATURATION: 96 %

## 2025-03-22 DIAGNOSIS — S16.1XXA ACUTE STRAIN OF NECK MUSCLE, INITIAL ENCOUNTER: Primary | ICD-10-CM

## 2025-03-22 PROCEDURE — 96372 THER/PROPH/DIAG INJ SC/IM: CPT

## 2025-03-22 PROCEDURE — 99284 EMERGENCY DEPT VISIT MOD MDM: CPT

## 2025-03-22 PROCEDURE — 6360000002 HC RX W HCPCS: Performed by: SPECIALIST

## 2025-03-22 PROCEDURE — 72040 X-RAY EXAM NECK SPINE 2-3 VW: CPT

## 2025-03-22 RX ORDER — IBUPROFEN 600 MG/1
600 TABLET, FILM COATED ORAL EVERY 6 HOURS PRN
Qty: 20 TABLET | Refills: 0 | Status: SHIPPED | OUTPATIENT
Start: 2025-03-22 | End: 2025-03-29

## 2025-03-22 RX ORDER — CYCLOBENZAPRINE HCL 10 MG
10 TABLET ORAL 3 TIMES DAILY PRN
Qty: 15 TABLET | Refills: 0 | Status: SHIPPED | OUTPATIENT
Start: 2025-03-22 | End: 2025-04-01

## 2025-03-22 RX ORDER — KETOROLAC TROMETHAMINE 30 MG/ML
30 INJECTION, SOLUTION INTRAMUSCULAR; INTRAVENOUS ONCE
Status: COMPLETED | OUTPATIENT
Start: 2025-03-22 | End: 2025-03-22

## 2025-03-22 RX ADMIN — KETOROLAC TROMETHAMINE 30 MG: 30 INJECTION, SOLUTION INTRAMUSCULAR; INTRAVENOUS at 06:05

## 2025-03-22 ASSESSMENT — ENCOUNTER SYMPTOMS
ABDOMINAL PAIN: 0
SORE THROAT: 0
COUGH: 0
BACK PAIN: 0
NAUSEA: 0
SHORTNESS OF BREATH: 0

## 2025-03-22 ASSESSMENT — PAIN DESCRIPTION - ORIENTATION: ORIENTATION: RIGHT

## 2025-03-22 ASSESSMENT — PAIN SCALES - GENERAL
PAINLEVEL_OUTOF10: 7
PAINLEVEL_OUTOF10: 10

## 2025-03-22 ASSESSMENT — PAIN DESCRIPTION - LOCATION: LOCATION: NECK

## 2025-03-22 ASSESSMENT — PAIN - FUNCTIONAL ASSESSMENT: PAIN_FUNCTIONAL_ASSESSMENT: 0-10

## 2025-03-22 ASSESSMENT — PAIN DESCRIPTION - DESCRIPTORS: DESCRIPTORS: SHARP;SHOOTING

## 2025-03-22 NOTE — DISCHARGE INSTRUCTIONS
Please understand that at this time there is no evidence for a more serious underlying process, but that early in the process of an illness or injury, an emergency department workup can be falsely reassuring.  You should contact your family doctor within the next 48 hours for a follow up appointment    THANK YOU!!!    From [facility] and [facility]    On behalf of the Emergency Department staff at [facility], I would like to thank you for giving us the opportunity to address your health care needs and concerns.    We hope that during your visit, our service was delivered in a professional and caring manner. Please keep [facility] in mind as we walk with you down the path to your own personal wellness.     Please expect an automated text message or email from us so we can ask a few questions about your health and progress. Based on your answers, a clinician may call you back to offer help and instructions.    Please understand that early in the process of an illness or injury, an emergency department workup can be falsely reassuring.  If you notice any worsening, changing or persistent symptoms please call your family doctor or return to the ER immediately.     Tell us how we did during your visit at [email or web address]   and let us know about your experience

## 2025-03-22 NOTE — ED PROVIDER NOTES
[facility]  [address] [facility] [address]  Phone: [phone]      Pt Name: Angelina Jean Baptiste  MRN: [id number]  Birthdate 1995  Date of evaluation: 3/22/2025      CHIEF COMPLAINT       Chief Complaint   Patient presents with    Neck Pain         HISTORY OF PRESENT ILLNESS    Angelina Jean Baptiste is a 29 y.o. female who presents   Chief Complaint   Patient presents with    Neck Pain   .    29-year-old female patient presents to the emergency department brought in via EMS for evaluation of right-sided neck pain which arises in the right trapezius muscle region and radiates towards the right side of the head into the right eye since 5:20 AM this morning.  Patient states she tried to turn in the bed from one side to another, she arched her back and states her whole weight was on the neck when she heard a loud cracking noise on the right side of her neck and is now having severe shooting pains especially with any range of movements of the neck.  She denies any headache or visual disturbances and denies any dizziness.  She denies any chest pain, shortness of breath, palpitations or diaphoresis.  She admits to having some tingling sensation in the left foot.  Pain is 10 out of 10 in intensity, worse with any range of movements and better with rest.  Patient has not taken any medication for the pain prior to arrival.  She denies any history of chronic neck pain or any previous neck surgeries.    REVIEW OF SYSTEMS     Review of Systems   Constitutional:  Negative for chills and fever.   HENT:  Negative for congestion and sore throat.    Eyes:  Negative for visual disturbance.   Respiratory:  Negative for cough and shortness of breath.    Cardiovascular:  Negative for chest pain and palpitations.   Gastrointestinal:  Negative for abdominal pain and nausea.   Genitourinary:  Negative for dysuria and frequency.   Musculoskeletal:  Positive for neck pain. Negative for back pain.   Skin:  Negative for  much better and resting comfortably.  Patient remained hemodynamically stable and neurologically intact throughout the ED course.      PROCEDURES:  None    FINAL IMPRESSION      1. Acute strain of neck muscle, initial encounter          DISPOSITION/PLAN   DISPOSITION Decision To Discharge 03/22/2025 06:57:45 AM   DISPOSITION CONDITION Stable           Condition on Disposition    Stable    PATIENT REFERRED TO:  Call 419-same-day for follow up    Call in 2 days  For reevaluation of current symptoms    [facility]  [address] [address]  [phone]    If symptoms worsen      DISCHARGE MEDICATIONS:  New Prescriptions    CYCLOBENZAPRINE (FLEXERIL) 10 MG TABLET    Take 1 tablet by mouth 3 times daily as needed for Muscle spasms    IBUPROFEN (IBU) 600 MG TABLET    Take 1 tablet by mouth every 6 hours as needed for Pain       (Please note that portions of this note were completed with a voice recognition program.  Efforts were made to edit the dictations but occasionally words are mis-transcribed.)    Miguel Robb MD,, MD, F.A.C.E.P.  Attending Emergency Physician       Miguel Robb MD  03/22/25 [id number]

## 2025-03-22 NOTE — ED NOTES
Pt presents to ED via ems a&o x4. Pt states she woke up about 40 min ago and arched her back pt reports when she did this she heard a crack in the right side of her neck and is now having severe shooting pains especially with ROM. Denies any other injury or hx of surgery to the neck

## 2025-06-16 NOTE — PROGRESS NOTES
[facility]  [address]  [address]  [facility] [address]  Dept: [phone]      25    Angelina Jean Baptiste       Gyn Annual Exam      CHIEF COMPLAINT:    Chief Complaint   Patient presents with    Establish Care     Previously seen Dr Farooq     Annual Exam     Last pap 24 WNL HPV-                 Blood pressure 125/72, height 1.651 m (5' 5\"), weight 90.4 kg (199 lb 6.4 oz), last menstrual period 2025, not currently breastfeeding.     HPI :   Angelina Jean Baptiste 1995 is a 29 y.o.   who is here for her annual exam. They are not preventing pregnancy at this point  Last pap 2024 NILM neg hpv  LSIL pap + hpv with NAVI 1  Hx NAVI 2 in     Periods are monthly, occurring every 30 days and lasting 5 days.   HMB- first 2 days  Dysmenorrhea- yes    In nursing school.  Son will be 4 in September.  Both her parents are addicts and she was raised by her grandmother  _____________________________________________________________________  Past Medical History:   Diagnosis Date    Asthma     Depression     Hashimoto's thyroiditis                                                                    Past Surgical History:   Procedure Laterality Date    APPENDECTOMY       SECTION  2021    COLONOSCOPY      COLPOSCOPY      LAPAROSCOPY  2019     Family History   Problem Relation Age of Onset    Substance Abuse Mother     Crohn's Disease Father     Cancer Maternal Grandmother     No Known Problems Maternal Grandfather     Diabetes Paternal Grandmother     Heart Failure Paternal Grandmother     High Blood Pressure Paternal Grandmother     Parkinson's Disease Paternal Grandmother     High Blood Pressure Paternal Grandfather     High Cholesterol Paternal Grandfather     Heart Attack Paternal Grandfather      Social History     Tobacco Use   Smoking Status Former   Smokeless Tobacco Never   Tobacco Comments    was only

## 2025-06-17 ENCOUNTER — OFFICE VISIT (OUTPATIENT)
Dept: OBGYN CLINIC | Age: 30
End: 2025-06-17
Payer: MEDICAID

## 2025-06-17 ENCOUNTER — HOSPITAL ENCOUNTER (OUTPATIENT)
Age: 30
Setting detail: SPECIMEN
Discharge: HOME | End: 2025-06-17

## 2025-06-17 VITALS
DIASTOLIC BLOOD PRESSURE: 72 MMHG | HEIGHT: 65 IN | SYSTOLIC BLOOD PRESSURE: 125 MMHG | BODY MASS INDEX: 33.22 KG/M2 | WEIGHT: 199.4 LBS

## 2025-06-17 DIAGNOSIS — Z01.419 ENCOUNTER FOR GYNECOLOGICAL EXAMINATION: Primary | ICD-10-CM

## 2025-06-17 PROCEDURE — 99385 PREV VISIT NEW AGE 18-39: CPT | Performed by: NURSE PRACTITIONER

## 2025-06-17 ASSESSMENT — ENCOUNTER SYMPTOMS
SHORTNESS OF BREATH: 0
ALLERGIC/IMMUNOLOGIC NEGATIVE: 1
COUGH: 0
GASTROINTESTINAL NEGATIVE: 1
ABDOMINAL DISTENTION: 0
BACK PAIN: 0
RESPIRATORY NEGATIVE: 1

## 2025-06-20 ENCOUNTER — RESULTS FOLLOW-UP (OUTPATIENT)
Dept: OBGYN CLINIC | Age: 30
End: 2025-06-20

## 2025-06-20 LAB
CYTOLOGY REPORT: NORMAL
HPV I/H RISK 4 DNA CVX QL NAA+PROBE: NOT DETECTED
HPV SAMPLE: NORMAL
HPV, INTERPRETATION: NORMAL
HPV16 DNA CVX QL NAA+PROBE: NOT DETECTED
HPV18 DNA CVX QL NAA+PROBE: NOT DETECTED
SPECIMEN DESCRIPTION: NORMAL